# Patient Record
Sex: FEMALE | Race: WHITE | Employment: OTHER | URBAN - METROPOLITAN AREA
[De-identification: names, ages, dates, MRNs, and addresses within clinical notes are randomized per-mention and may not be internally consistent; named-entity substitution may affect disease eponyms.]

---

## 2017-02-08 ENCOUNTER — HOSPITAL ENCOUNTER (EMERGENCY)
Age: 82
Discharge: HOME | End: 2017-02-08
Attending: EMERGENCY MEDICINE
Payer: MEDICARE

## 2017-02-08 VITALS
TEMPERATURE: 98.1 F | HEIGHT: 64 IN | DIASTOLIC BLOOD PRESSURE: 59 MMHG | WEIGHT: 140 LBS | BODY MASS INDEX: 23.9 KG/M2 | SYSTOLIC BLOOD PRESSURE: 179 MMHG | RESPIRATION RATE: 16 BRPM | HEART RATE: 63 BPM | OXYGEN SATURATION: 99 %

## 2017-02-08 DIAGNOSIS — K42.9 UMBILICAL HERNIA WITHOUT OBSTRUCTION AND WITHOUT GANGRENE: Primary | ICD-10-CM

## 2017-02-08 PROCEDURE — 99283 EMERGENCY DEPT VISIT LOW MDM: CPT

## 2017-02-09 NOTE — ED TRIAGE NOTES
TRIAGE NOTE: Pt arrives via EMS for c/o abdominal hernia. Pt reports being able to push it back in earlier this evening but was unable to prior to EMS arrival. Pt denies pain at this time and thinks it may have gone back into place en route.

## 2017-02-09 NOTE — ROUTINE PROCESS
Pt discharged from ED with follow up care instructions. Abdominal binder placed on pt for comfort. VSS. MD aware of elevated BP at discharge. Pt to take scheduled medication this evening. Pt wheeled to [employer] car.

## 2017-02-09 NOTE — ED PROVIDER NOTES
HPI Comments: The patient is a 26-year-old female with a past medical history significant for atrial fibrillation, hypertension, hypercholesterolemia, umbilical hernia, who presents to the ED by EMS with a complaint of abdominal pain that began approximately an hour prior to arrival to the ED asked the patient states that her hernia popped out of her abdominal wall and could not be reduced. EMS was notified and the patient was transported to the ED because of severe abdominal pain. However, while in route, the patient was able to relax and her hernia spontaneously reduced. She is better at this time and denies any further discomfort. She denies headache, neck pain, back pain, sore throat, cough, congestion, diarrhea, constipation, nausea, vomiting, dizziness, weakness, numbness, dysuria, sick contacts or recent travel. Patient is a 80 y.o. female presenting with abdominal pain. Abdominal Pain           Past Medical History:   Diagnosis Date    HTN (hypertension) 7/31/2011    PAF (paroxysmal atrial fibrillation) ([facility] [address] 7/31/2011       Past Surgical History:   Procedure Laterality Date    Hx cataract removal      Hx knee arthroscopy           History reviewed. No pertinent family history. Social History     Social History    Marital status:      Spouse name: N/A    Number of children: N/A    Years of education: N/A     Occupational History    Not on file. Social History Main Topics    Smoking status: Former Smoker    Smokeless tobacco: Not on file    Alcohol use Yes    Drug use: Not on file    Sexual activity: Not on file     Other Topics Concern    Not on file     Social History Narrative    ** Merged History Encounter **              ALLERGIES: Benicar Meryle Party; Cleocin [clindamycin hcl]; Hydrochlorothiazide; Keflex [cephalexin]; and Sulfa (sulfonamide antibiotics)    Review of Systems   Gastrointestinal: Positive for abdominal pain.    All other systems reviewed and are negative. Vitals:    02/08/17 2211   BP: 173/69   Pulse: 74   Resp: 16   Temp: 98.1 °F (36.7 °C)   SpO2: 96%   Weight: 63.5 kg (140 lb)   Height: 5' 4\" (1.626 m)            Physical Exam   Nursing note and vitals reviewed. CONSTITUTIONAL: Well-appearing; well-nourished; in no apparent distress  HEAD: Normocephalic; atraumatic  EYES: PERRL; EOM intact; conjunctiva and sclera are clear bilaterally. ENT: No rhinorrhea; normal pharynx with no tonsillar hypertrophy; mucous membranes pink/moist, no erythema, no exudate. NECK: Supple; non-tender; no cervical lymphadenopathy  CARD: Normal S1, S2; no murmurs, rubs, or gallops. Regular rate and rhythm. RESP: Normal respiratory effort; breath sounds clear and equal bilaterally; no wheezes, rhonchi, or rales. ABD: Normal bowel sounds; non-distended; easily reducible umbilical hernia; non-tender; no palpable organomegaly, no masses, no bruits. Back Exam: Normal inspection; no vertebral point tenderness, no CVA tenderness. Normal range of motion. EXT: Normal ROM in all four extremities; non-tender to palpation; no swelling or deformity; distal pulses are normal, no edema. SKIN: Warm; dry; no rash. NEURO:Alert and oriented x 3, coherent, SAMMY-XII grossly intact, sensory and motor are non-focal.        MDM  Number of Diagnoses or Management Options  Umbilical hernia without obstruction and without gangrene:   Diagnosis management comments: Assessment: umbilical hernia-reduced and stable/ hypertension-stable. The patient has not yet taken her nightly dose of antihypertensive    Plan: education and reassurance/ abdominal wall binder/ discharge with outpatient referral/ Monitor and Reevaluate.          Amount and/or Complexity of Data Reviewed  Clinical lab tests: ordered and reviewed  Tests in the radiology section of CPT®: ordered and reviewed  Tests in the medicine section of CPT®: reviewed and ordered  Discussion of test results with the performing providers: yes  Obtain history from someone other than the patient: yes  Discuss the patient with other providers: yes    Risk of Complications, Morbidity, and/or Mortality  Presenting problems: moderate  Diagnostic procedures: moderate  Management options: moderate      ED Course       Procedures     Progress Note:   Pt has been reexamined by Hannah Larsen MD. Pt is feeling much better. Symptoms have improved. All available results have been reviewed with pt and any available family. Pt understands sx, dx, and tx in ED. Care plan has been outlined and questions have been answered. Pt is ready to go home. Will send home on umbilical hernia/ hypertension instruction. Outpatient referral with PCP/ general surgery for elective repair of hernia as needed. Written by Hannah Larsen MD,10:27 PM    .   .

## 2017-02-09 NOTE — DISCHARGE INSTRUCTIONS
We hope that we have addressed all of your medical concerns. The examination and treatment you received in the Emergency Department were for an emergent problem and were not intended as complete care. It is important that you follow up with your healthcare provider(s) for ongoing care. If your symptoms worsen or do not improve as expected, and you are unable to reach your usual health care provider(s), you should return to the Emergency Department. Today's healthcare is undergoing tremendous change, and patient satisfaction surveys are one of the many tools to assess the quality of medical care. You may receive a survey from the [employer] regarding your experience in the Emergency Department. I hope that your experience has been completely positive, particularly the medical care that I provided. As such, please participate in the survey; anything less than excellent does not meet my expectations or intentions. [address] [place] [address] and [facility] participate in nationally recognized quality of care measures. If your blood pressure is greater than 120/80, as reported below, we urge that you seek medical care to address the potential of high blood pressure, commonly known as hypertension. Hypertension can be hereditary or can be caused by certain medical conditions, pain, stress, or \"white coat syndrome. \"       Please make an appointment with your health care provider(s) for follow up of your Emergency Department visit. VITALS:   Patient Vitals for the past 8 hrs:   Temp Pulse Resp BP SpO2   02/08/17 2211 98.1 °F (36.7 °C) 74 16 173/69 96 %          Thank you for allowing us to provide you with medical care today. We realize that you have many choices for your emergency care needs. Please choose us in the future for any continued health care needs. Connie Olivas MD    [address] [place] [address].   Office: [phone]            No results found for this or any previous visit (from the past 24 hour(s)). No results found. Hernia: Care Instructions  Your Care Instructions    A hernia develops when tissue bulges through a weak spot in the wall of your belly. The groin area and the navel are common areas for a hernia. A hernia can also develop near the area of a surgery you had before. Pressure from lifting, straining, or coughing can tear the weak area, causing the hernia to bulge and be painful. If you cannot push a hernia back into place, the tissue may become trapped outside the belly wall. If the hernia gets twisted and loses its blood supply, it will swell and die. This is called a strangulated hernia. It usually causes a lot of pain. It needs treatment right away. Some hernias need to be repaired to prevent a strangulated hernia. If your hernia causes symptoms or is large, you may need surgery. Follow-up care is a key part of your treatment and safety. Be sure to make and go to all appointments, and call your doctor if you are having problems. Its also a good idea to know your test results and keep a list of the medicines you take. How can you care for yourself at home? · Take care when lifting heavy objects. · Stay at a healthy weight. · Do not smoke. Smoking can cause coughing, which can cause your hernia to bulge. If you need help quitting, talk to your doctor about stop-smoking programs and medicines. These can increase your chances of quitting for good. · Talk with your doctor before wearing a corset or truss for a hernia. These devices are not recommended for treating hernias and sometimes can do more harm than good. There may be certain situations when your doctor thinks a truss would work, but these are rare. When should you call for help? Call your doctor now or seek immediate medical care if:  · You have severe belly pain. · You have nausea or vomiting.   · You have belly pain and are not passing gas or stool. · You cannot push the hernia back into place with gentle pressure when you are lying down. · The skin over the hernia turns red or becomes tender. Watch closely for changes in your health, and be sure to contact your doctor if:  · You have new or increased pain. · You do not get better as expected. Where can you learn more? Go to http://aaliyah-navneet.info/. Enter C129 in the search box to learn more about \"Hernia: Care Instructions. \"  Current as of: August 9, 2016  Content Version: 11.1  © 7154-5068 [email or web address]. Care instructions adapted under license by [employer] (which disclaims liability or warranty for this information). If you have questions about a medical condition or this instruction, always ask your healthcare professional. [address] any warranty or liability for your use of this information.

## 2017-02-22 ENCOUNTER — OFFICE VISIT (OUTPATIENT)
Dept: SURGERY | Age: 82
End: 2017-02-22

## 2017-02-22 VITALS
TEMPERATURE: 98.4 F | RESPIRATION RATE: 16 BRPM | HEART RATE: 66 BPM | SYSTOLIC BLOOD PRESSURE: 158 MMHG | DIASTOLIC BLOOD PRESSURE: 94 MMHG | BODY MASS INDEX: 23.39 KG/M2 | WEIGHT: 137 LBS | HEIGHT: 64 IN | OXYGEN SATURATION: 95 %

## 2017-02-22 DIAGNOSIS — K42.9 UMBILICAL HERNIA WITHOUT OBSTRUCTION AND WITHOUT GANGRENE: Primary | ICD-10-CM

## 2017-02-22 NOTE — PROGRESS NOTES
Surgery Consult    Subjective:      Unique Valadez is a 80 y.o.  female who was referred by Dr. Luis A Tyler for evaluation of umbilical hernia. She has had this hernia for at least 10 years now but it recently popped out and she was unable to reduce it. She was concerned and was transported to an ER but in transit, she was able to reduce the hernia back in. She presents today to discuss possible surgical intervention. Chief Complaint   Patient presents with    Umbilical Hernia       Patient Active Problem List    Diagnosis Date Noted    Umbilical hernia 66/13/5887    Essential hypertension 05/03/2015    Deaf 08/27/2013    PAF (paroxysmal atrial fibrillation) ([facility] [address] 07/31/2011     Past Medical History:   Diagnosis Date    HTN (hypertension) 7/31/2011    PAF (paroxysmal atrial fibrillation) ([facility] [address] 8/83/6551    Umbilical hernia 3/46/5815      Past Surgical History:   Procedure Laterality Date    HX CATARACT REMOVAL      HX KNEE ARTHROSCOPY        Social History   Substance Use Topics    Smoking status: Former Smoker    Smokeless tobacco: Not on file    Alcohol use Yes      No family history on file. Current Outpatient Prescriptions   Medication Sig    diltiazem CD (CARDIZEM CD) 240 mg ER capsule Take 1 Cap by mouth daily.  multivitamin (ONE A DAY) tablet Take 1 Tab by mouth daily.  cloNIDine (CATAPRES) 0.1 mg/24 hr patch 1 Patch by TransDERmal route every seven (7) days.  labetalol (NORMODYNE) 200 mg tablet TAKE 1 TABLET BY MOUTH EVERY DAY     No current facility-administered medications for this visit. Allergies   Allergen Reactions    Benicar [Olmesartan] Cough    Cleocin [Clindamycin Hcl] Hives    Hydrochlorothiazide Other (comments)     GI upset - pancreatitis ?     Keflex [Cephalexin] Hives    Sulfa (Sulfonamide Antibiotics) Unknown (comments)        Review of Systems:    A comprehensive review of systems was negative except for that written in the History of Present Illness. Objective:     Visit Vitals    BP (!) 158/94 (BP 1 Location: Right arm, BP Patient Position: Sitting)    Pulse 66    Temp 98.4 °F (36.9 °C) (Oral)    Resp 16    Ht 5' 4\" (1.626 m)    Wt 137 lb (62.1 kg)    SpO2 95%    BMI 23.52 kg/m2         Physical Exam:  General appearance: alert, cooperative, no distress, appears stated age  Head: Normocephalic, without obvious abnormality, atraumatic  Abdomen: no organomegaly, 1 cm umbilical hernia with 2 cm mushroom non-tender and easily reducible   Neurologic: Grossly normal      Assessment:       ICD-10-CM ICD-9-CM    1. Umbilical hernia without obstruction and without gangrene K42.9 553.1      Does not bother her enough yet to want to have it repaired    Plan:     No indication for surgical intervention. Call for questions. Written by ujli Cardenas, as dictated by Lucho Wilson MD.    Total face to face time with patient: 12 minutes. Greater than 50% of the time was spent in counseling. Signed By: Lucho Wilson MD     February 22, 2017

## 2017-02-22 NOTE — PROGRESS NOTES
1. Have you been to the ER, urgent care clinic since your last visit? Hospitalized since your last visit? Yes - 1-3-35 - umbilical hernia    2. Have you seen or consulted any other health care providers outside of the [address] [place] [address] since your last visit? Include any pap smears or colon screening.  No

## 2017-02-22 NOTE — MR AVS SNAPSHOT
Visit Information Date & Time Provider Department Dept. Phone Encounter #  
 2/22/2017  2:00 PM Philippkarina ParekhHeather [phone] [id number] Upcoming Health Maintenance Date Due DTaP/Tdap/Td series (1 - Tdap) 5/20/1944 GLAUCOMA SCREENING Q2Y 5/20/1988 OSTEOPOROSIS SCREENING (DEXA) 5/20/1988 MEDICARE YEARLY EXAM 5/20/1988 Pneumococcal 65+ Low/Medium Risk (2 of 2 - PPSV23) 7/31/1997 INFLUENZA AGE 9 TO ADULT 8/1/2016 Allergies as of 2/22/2017  Review Complete On: 2/22/2017 By: Ngoc Parekh MD  
  
 Severity Noted Reaction Type Reactions Benicar [Olmesartan]  07/31/2011    Cough Cleocin [Clindamycin Hcl]  02/08/2017    Hives Hydrochlorothiazide  08/02/2014    Other (comments) GI upset - pancreatitis ? Keflex [Cephalexin]  02/08/2017    Hives Sulfa (Sulfonamide Antibiotics)  07/31/2011    Unknown (comments) Current Immunizations  Reviewed on 8/27/2013 Name Date Pneumococcal Vaccine (Unspecified Type) 7/31/1992 Zoster Vaccine, Live 8/27/2007 Not reviewed this visit You Were Diagnosed With   
  
 Codes Comments Umbilical hernia without obstruction and without gangrene    -  Primary ICD-10-CM: K42.9 ICD-9-CM: 553.1 Vitals BP  
  
  
  
  
  
 (!) 158/94 (BP 1 Location: Right arm, BP Patient Position: Sitting) BMI and BSA Data Body Mass Index Body Surface Area  
 23.52 kg/m 2 1.67 m 2 Preferred Pharmacy Pharmacy Name Phone [facility] [address] [facility] [phone] Your Updated Medication List  
  
   
This list is accurate as of: 2/22/17  3:00 PM.  Always use your most recent med list.  
  
  
  
  
 cloNIDine 0.1 mg/24 hr patch Commonly known as:  CATAPRES  
1 Patch by TransDERmal route every seven (7) days. dilTIAZem  mg ER capsule Commonly known as:  CARDIZEM CD Take 1 Cap by mouth daily. labetalol 200 mg tablet Commonly known as:  NORMODYNE  
TAKE 1 TABLET BY MOUTH EVERY DAY  
  
 multivitamin tablet Commonly known as:  ONE A DAY Take 1 Tab by mouth daily. Patient Instructions MyChart Activation Thank you for requesting access to [employer]. Please follow the instructions below to securely access and download your online medical record. [employer] allows you to send messages to your doctor, view your test results, renew your prescriptions, schedule appointments, and more. How Do I Sign Up? 1. In your internet browser, go to [email or web address] 
2. Click on the First Time User? Click Here link in the Sign In box. You will be redirect to the New Member Sign Up page. 3. Enter your [employer]s Access Code exactly as it appears below. You will not need to use this code after youve completed the sign-up process. If you do not sign up before the expiration date, you must request a new code. [employer]s Access Code: [email or web address] Expires: 2017 10:05 PM (This is the date your [employer]s access code will ) 4. Enter the last four digits of your Social Security Number (xxxx) and Date of Birth (mm/dd/yyyy) as indicated and click Submit. You will be taken to the next sign-up page. 5. Create a [employer]s ID. This will be your [employer] login ID and cannot be changed, so think of one that is secure and easy to remember. 6. Create a [employer] password. You can change your password at any time. 7. Enter your Password Reset Question and Answer. This can be used at a later time if you forget your password. 8. Enter your e-mail address. You will receive e-mail notification when new information is available in [address] 9. Click Sign Up. You can now view and download portions of your medical record. 10. Click the Download Summary menu link to download a portable copy of your medical information. Additional Information If you have questions, please visit the Frequently Asked Questions section of the [employer] website at [email or web address] Remember, [employer] is NOT to be used for urgent needs. For medical emergencies, dial 911. Introducing [facility]! Mari Suri introduces [employer] patient portal. Now you can access parts of your medical record, email your doctor's office, and request medication refills online. 1. In your internet browser, go to [email or web address] Click 2. Click on the First Time User? Click Here link in the Sign In box. You will see the New Member Sign Up page. 3. Enter your [employer] Access Code exactly as it appears below. You will not need to use this code after youve completed the sign-up process. If you do not sign up before the expiration date, you must request a new code. · [employer] Access Code: [email or web address] Expires: 5/9/2017 10:05 PM 
 
4. Enter the last four digits of your Social Security Number (xxxx) and Date of Birth (mm/dd/yyyy) as indicated and click Submit. You will be taken to the next sign-up page. 5. Create a [employer] ID. This will be your [employer] login ID and cannot be changed, so think of one that is secure and easy to remember. 6. Create a [employer] password. You can change your password at any time. 7. Enter your Password Reset Question and Answer. This can be used at a later time if you forget your password. 8. Enter your e-mail address. You will receive e-mail notification when new information is available in [address] 9. Click Sign Up. You can now view and download portions of your medical record. 10. Click the Download Summary menu link to download a portable copy of your medical information. If you have questions, please visit the Frequently Asked Questions section of the [employer] website. Remember, [employer] is NOT to be used for urgent needs. For medical emergencies, dial 911. Now available from your iPhone and Android! Please provide this summary of care documentation to your next provider. Your primary care clinician is listed as DOMINIQUE Solomon. If you have any questions after today's visit, please call [phone].

## 2017-02-22 NOTE — PATIENT INSTRUCTIONS
[employer] Activation    Thank you for requesting access to [employer]. Please follow the instructions below to securely access and download your online medical record. [employer] allows you to send messages to your doctor, view your test results, renew your prescriptions, schedule appointments, and more. How Do I Sign Up? 1. In your internet browser, go to [email or web address]  2. Click on the First Time User? Click Here link in the Sign In box. You will be redirect to the New Member Sign Up page. 3. Enter your [employer] Access Code exactly as it appears below. You will not need to use this code after youve completed the sign-up process. If you do not sign up before the expiration date, you must request a new code. [employer] Access Code: [email or web address]  Expires: 2017 10:05 PM (This is the date your [employer] access code will )    4. Enter the last four digits of your Social Security Number (xxxx) and Date of Birth (mm/dd/yyyy) as indicated and click Submit. You will be taken to the next sign-up page. 5. Create a [employer] ID. This will be your [employer] login ID and cannot be changed, so think of one that is secure and easy to remember. 6. Create a [employer] password. You can change your password at any time. 7. Enter your Password Reset Question and Answer. This can be used at a later time if you forget your password. 8. Enter your e-mail address. You will receive e-mail notification when new information is available in [address] 9. Click Sign Up. You can now view and download portions of your medical record. 10. Click the Download Summary menu link to download a portable copy of your medical information. Additional Information    If you have questions, please visit the Frequently Asked Questions section of the [employer] website at [email or web address] Remember, [employer] is NOT to be used for urgent needs. For medical emergencies, dial 911.

## 2017-07-26 ENCOUNTER — OFFICE VISIT (OUTPATIENT)
Dept: SURGERY | Age: 82
End: 2017-07-26

## 2017-07-26 VITALS
SYSTOLIC BLOOD PRESSURE: 182 MMHG | HEIGHT: 64 IN | OXYGEN SATURATION: 94 % | TEMPERATURE: 97.9 F | RESPIRATION RATE: 16 BRPM | DIASTOLIC BLOOD PRESSURE: 68 MMHG | WEIGHT: 134 LBS | BODY MASS INDEX: 22.88 KG/M2 | HEART RATE: 68 BPM

## 2017-07-26 DIAGNOSIS — K42.9 UMBILICAL HERNIA WITHOUT OBSTRUCTION AND WITHOUT GANGRENE: Primary | ICD-10-CM

## 2017-07-26 RX ORDER — DILTIAZEM HYDROCHLORIDE 180 MG/1
180 CAPSULE, COATED, EXTENDED RELEASE ORAL
COMMUNITY
Start: 2016-08-10 | End: 2017-08-01

## 2017-07-26 RX ORDER — LABETALOL 200 MG/1
1 TABLET, FILM COATED ORAL
COMMUNITY
Start: 2017-04-25 | End: 2017-08-01

## 2017-07-26 RX ORDER — CLONIDINE 0.1 MG/24H
1 PATCH, EXTENDED RELEASE TRANSDERMAL
COMMUNITY
Start: 2016-07-07 | End: 2017-08-01

## 2017-07-26 RX ORDER — SENNOSIDES 8.6 MG/1
8.6 TABLET ORAL
COMMUNITY
Start: 2017-07-25 | End: 2017-08-01

## 2017-07-26 RX ORDER — POLYETHYLENE GLYCOL 3350 17 G/17G
17 POWDER, FOR SOLUTION ORAL
COMMUNITY
Start: 2017-07-25 | End: 2017-08-01

## 2017-07-26 NOTE — PROGRESS NOTES
Subjective:      Mathieu Andres is a 80 y.o.  female who presents today to discuss umbilical hernia surgery. The hernia popped out again last night and she went to the ER in [place], three minutes before the doctor came into the room, it spontaneously reduced. Chief Complaint   Patient presents with    Umbilical Hernia     Discuss Hernia Surgery. Patient Active Problem List    Diagnosis Date Noted    Umbilical hernia 69/49/3272    Essential hypertension 05/03/2015    Deaf 08/27/2013    PAF (paroxysmal atrial fibrillation) ([facility] [address] 07/31/2011     Past Medical History:   Diagnosis Date    HTN (hypertension) 7/31/2011    PAF (paroxysmal atrial fibrillation) ([facility] [address] 0/52/0275    Umbilical hernia 7/15/0885      Past Surgical History:   Procedure Laterality Date    HX CATARACT REMOVAL      HX KNEE ARTHROSCOPY        Social History   Substance Use Topics    Smoking status: Former Smoker    Smokeless tobacco: Never Used    Alcohol use Yes      History reviewed. No pertinent family history. Prior to Admission medications    Medication Sig Start Date End Date Taking? Authorizing Provider   therapeutic multivitamin-minerals (VITAMINS AND MINERALS) tablet Take 1 Tab by mouth. Yes Historical Provider   dilTIAZem CD (CARDIZEM CD) 180 mg ER capsule Take 180 mg by mouth. 8/10/16  Yes Historical Provider   labetalol (NORMODYNE) 200 mg tablet TAKE ONE TABLET BY MOUTH ONCE DAILY 4/25/17  Yes DOMINIQUE Quintana MD   multivitamin (ONE A DAY) tablet Take 1 Tab by mouth daily. Yes Historical Provider   polyethylene glycol (MIRALAX) 17 gram/dose powder Take 17 g by mouth. 7/25/17   Historical Provider   senna (SENOKOT) 8.6 mg tablet Take 8.6 mg by mouth. 7/25/17   Historical Provider   cloNIDine (CATAPRES) 0.1 mg/24 hr patch 1 Patch by TransDERmal route.  7/7/16   Historical Provider   labetalol (NORMODYNE) 200 mg tablet Take 1 Tab by mouth. 4/25/17   Historical Provider   diltiazem CD (CARDIZEM CD) 240 mg ER capsule Take 1 Cap by mouth daily. 8/10/16   Mary Kate Willams MD   cloNIDine (CATAPRES) 0.1 mg/24 hr patch 1 Patch by TransDERmal route every seven (7) days. 7/7/16   Mary Kate Willams MD     Allergies   Allergen Reactions    Benicar [Olmesartan] Cough    Cleocin [Clindamycin Hcl] Hives    Hydrochlorothiazide Other (comments)     GI upset - pancreatitis ?  Keflex [Cephalexin] Hives    Sulfa (Sulfonamide Antibiotics) Unknown (comments)         Review of Systems:    A comprehensive review of systems was negative except for that written in the History of Present Illness. Objective:     Visit Vitals    /68 (BP 1 Location: Left arm, BP Patient Position: Sitting)    Pulse 68    Temp 97.9 °F (36.6 °C) (Oral)    Resp 16    Ht 5' 4\" (1.626 m)    Wt 134 lb (60.8 kg)    SpO2 94%    BMI 23 kg/m2       Physical Exam:  General appearance: alert, cooperative, no distress, appears stated age  Head: Normocephalic, without obvious abnormality, atraumatic  Lungs: clear to auscultation bilaterally  Heart: regular rate and rhythm, S1, S2 normal, no murmur, click, rub or gallop  Abdomen: There is a tender and reducible, 5.9ZH umbilical hernia. Neurologic: Grossly normal      Assessment:       ICD-10-CM ICD-9-CM    1. Umbilical hernia without obstruction and without gangrene K42.9 553.1        Plan: Will repair the umbilical hernia with observation overnight in the hospital.    Written by juli Dunn, as dictated by Gonzalo Gomez. Vedia Dance, MD.    Total face to face time with patient: 9 minutes. Greater than 50% of the time was spent in counseling. Signed By: Bjorn Leap Vedia Dance, MD    July 26, 2017

## 2017-07-26 NOTE — PROGRESS NOTES
Aziza Meyers is a 80 y.o. female    Chief Complaint   Patient presents with    Umbilical Hernia     Discuss Hernia Surgery.

## 2017-07-26 NOTE — MR AVS SNAPSHOT
Visit Information Date & Time Provider Department Dept. Phone Encounter #  
 7/26/2017  1:50 PM Evelia Leonard, [address] [phone] [id number] Follow-up Instructions Routing History Follow-up and Disposition History Upcoming Health Maintenance Date Due DTaP/Tdap/Td series (1 - Tdap) 5[birth year]20/1944 GLAUCOMA SCREENING Q2Y 5/20/1988 OSTEOPOROSIS SCREENING (DEXA) 5/20/1988 MEDICARE YEARLY EXAM 5/20/1988 Pneumococcal 65+ Low/Medium Risk (2 of 2 - PPSV23) 7/31/1997 INFLUENZA AGE 9 TO ADULT 8/1/2017 Allergies as of 7/26/2017  Review Complete On: 7/26/2017 By: Evelia Leonard MD  
  
 Severity Noted Reaction Type Reactions Benicar [Olmesartan]  07/31/2011    Cough Cleocin [Clindamycin Hcl]  02/08/2017    Hives Hydrochlorothiazide  08/02/2014    Other (comments) GI upset - pancreatitis ? Keflex [Cephalexin]  02/08/2017    Hives Sulfa (Sulfonamide Antibiotics)  07/31/2011    Unknown (comments) Current Immunizations  Reviewed on 8/27/2013 Name Date ZZZ-RETIRED (DO NOT USE) Pneumococcal Vaccine (Unspecified Type) 7/31/1992 Zoster Vaccine, Live 8/27/2007 Not reviewed this visit You Were Diagnosed With   
  
 Codes Comments Umbilical hernia without obstruction and without gangrene    -  Primary ICD-10-CM: K42.9 ICD-9-CM: 553.1 Vitals BP Pulse Temp Resp Height(growth percentile) Weight(growth percentile) 182/68 (BP 1 Location: Left arm, BP Patient Position: Sitting) 68 97.9 °F (36.6 °C) (Oral) 16 5' 4\" (1.626 m) 134 lb (60.8 kg) SpO2 BMI OB Status Smoking Status 94% 23 kg/m2 Postmenopausal Former Smoker Vitals History BMI and BSA Data Body Mass Index Body Surface Area  
 23 kg/m 2 1.66 m 2 Preferred Pharmacy Pharmacy Name Phone [facility] [address] Dariel Tiffanie [phone] Your Updated Medication List  
  
   
 This list is accurate as of: 7/26/17  2:27 PM.  Always use your most recent med list.  
  
  
  
  
 * cloNIDine 0.1 mg/24 hr patch Commonly known as:  CATAPRES  
1 Patch by TransDERmal route every seven (7) days. * cloNIDine 0.1 mg/24 hr patch Commonly known as:  CATAPRES  
1 Patch by TransDERmal route. * dilTIAZem  mg ER capsule Commonly known as:  CARDIZEM CD Take 1 Cap by mouth daily. * CARDIZEM  mg ER capsule Generic drug:  dilTIAZem CD Take 180 mg by mouth. * labetalol 200 mg tablet Commonly known as:  NORMODYNE  
TAKE ONE TABLET BY MOUTH ONCE DAILY  
  
 * labetalol 200 mg tablet Commonly known as:  Linda Beckers Take 1 Tab by mouth. MIRALAX 17 gram/dose powder Generic drug:  polyethylene glycol Take 17 g by mouth.  
  
 multivitamin tablet Commonly known as:  ONE A DAY Take 1 Tab by mouth daily. senna 8.6 mg tablet Commonly known as:  [employer] Take 8.6 mg by mouth. VITAMINS AND MINERALS tablet Generic drug:  therapeutic multivitamin-minerals Take 1 Tab by mouth. * Notice: This list has 6 medication(s) that are the same as other medications prescribed for you. Read the directions carefully, and ask your doctor or other care provider to review them with you. Introducing [facility]! Dear Chapis Parkinson: 
Thank you for requesting a [employer] account. Our records indicate that you already have an active [employer] account. You can access your account anytime at [email or web address] Did you know that you can access your hospital and ER discharge instructions at any time in [employer]? You can also review all of your test results from your hospital stay or ER visit. Additional Information If you have questions, please visit the Frequently Asked Questions section of the [employer] website at [email or web address] Remember, [employer] is NOT to be used for urgent needs.  For medical emergencies, dial 911. Now available from your iPhone and Android! Please provide this summary of care documentation to your next provider. Your primary care clinician is listed as DOMINIQUE Hamilton. If you have any questions after today's visit, please call [phone].

## 2017-08-01 ENCOUNTER — HOSPITAL ENCOUNTER (OUTPATIENT)
Dept: GENERAL RADIOLOGY | Age: 82
Discharge: HOME | End: 2017-08-01
Payer: MEDICARE

## 2017-08-01 ENCOUNTER — HOSPITAL ENCOUNTER (OUTPATIENT)
Dept: PREADMISSION TESTING | Age: 82
Discharge: HOME | End: 2017-08-01
Payer: MEDICARE

## 2017-08-01 VITALS
HEART RATE: 67 BPM | DIASTOLIC BLOOD PRESSURE: 62 MMHG | SYSTOLIC BLOOD PRESSURE: 158 MMHG | RESPIRATION RATE: 20 BRPM | BODY MASS INDEX: 22.71 KG/M2 | HEIGHT: 64 IN | TEMPERATURE: 98.2 F | WEIGHT: 133 LBS

## 2017-08-01 LAB
ALBUMIN SERPL BCP-MCNC: 3 G/DL (ref 3.5–5)
ALBUMIN/GLOB SERPL: 0.9 {RATIO} (ref 1.1–2.2)
ALP SERPL-CCNC: 66 U/L (ref 45–117)
ALT SERPL-CCNC: 18 U/L (ref 12–78)
ANION GAP BLD CALC-SCNC: 5 MMOL/L (ref 5–15)
AST SERPL W P-5'-P-CCNC: 15 U/L (ref 15–37)
BASOPHILS # BLD AUTO: 0.1 K/UL (ref 0–0.1)
BASOPHILS # BLD: 1 % (ref 0–1)
BILIRUB SERPL-MCNC: 0.3 MG/DL (ref 0.2–1)
BUN SERPL-MCNC: 22 MG/DL (ref 6–20)
BUN/CREAT SERPL: 27 (ref 12–20)
CALCIUM SERPL-MCNC: 8.6 MG/DL (ref 8.5–10.1)
CHLORIDE SERPL-SCNC: 103 MMOL/L (ref 97–108)
CO2 SERPL-SCNC: 30 MMOL/L (ref 21–32)
CREAT SERPL-MCNC: 0.81 MG/DL (ref 0.55–1.02)
EOSINOPHIL # BLD: 0.1 K/UL (ref 0–0.4)
EOSINOPHIL NFR BLD: 3 % (ref 0–7)
ERYTHROCYTE [DISTWIDTH] IN BLOOD BY AUTOMATED COUNT: 14.3 % (ref 11.5–14.5)
GLOBULIN SER CALC-MCNC: 3.2 G/DL (ref 2–4)
GLUCOSE SERPL-MCNC: 141 MG/DL (ref 65–100)
HCT VFR BLD AUTO: 36 % (ref 35–47)
HGB BLD-MCNC: 12 G/DL (ref 11.5–16)
LYMPHOCYTES # BLD AUTO: 26 % (ref 12–49)
LYMPHOCYTES # BLD: 1.4 K/UL (ref 0.8–3.5)
MCH RBC QN AUTO: 30.8 PG (ref 26–34)
MCHC RBC AUTO-ENTMCNC: 33.3 G/DL (ref 30–36.5)
MCV RBC AUTO: 92.5 FL (ref 80–99)
MONOCYTES # BLD: 0.7 K/UL (ref 0–1)
MONOCYTES NFR BLD AUTO: 12 % (ref 5–13)
NEUTS SEG # BLD: 3.1 K/UL (ref 1.8–8)
NEUTS SEG NFR BLD AUTO: 58 % (ref 32–75)
PLATELET # BLD AUTO: 220 K/UL (ref 150–400)
POTASSIUM SERPL-SCNC: 4.2 MMOL/L (ref 3.5–5.1)
PROT SERPL-MCNC: 6.2 G/DL (ref 6.4–8.2)
RBC # BLD AUTO: 3.89 M/UL (ref 3.8–5.2)
SODIUM SERPL-SCNC: 138 MMOL/L (ref 136–145)
WBC # BLD AUTO: 5.4 K/UL (ref 3.6–11)

## 2017-08-01 PROCEDURE — 36415 COLL VENOUS BLD VENIPUNCTURE: CPT | Performed by: SURGERY

## 2017-08-01 PROCEDURE — 85025 COMPLETE CBC W/AUTO DIFF WBC: CPT | Performed by: SURGERY

## 2017-08-01 PROCEDURE — 71020 XR CHEST PA LAT: CPT

## 2017-08-01 PROCEDURE — 93005 ELECTROCARDIOGRAM TRACING: CPT

## 2017-08-01 PROCEDURE — 80053 COMPREHEN METABOLIC PANEL: CPT | Performed by: SURGERY

## 2017-08-01 RX ORDER — DILTIAZEM HYDROCHLORIDE 180 MG/1
180 CAPSULE, COATED, EXTENDED RELEASE ORAL 2 TIMES DAILY
COMMUNITY

## 2017-08-02 ENCOUNTER — ANESTHESIA EVENT (OUTPATIENT)
Dept: SURGERY | Age: 82
End: 2017-08-02
Payer: MEDICARE

## 2017-08-02 LAB
ATRIAL RATE: 67 BPM
CALCULATED P AXIS, ECG09: 78 DEGREES
CALCULATED R AXIS, ECG10: -14 DEGREES
CALCULATED T AXIS, ECG11: -48 DEGREES
DIAGNOSIS, 93000: NORMAL
P-R INTERVAL, ECG05: 260 MS
Q-T INTERVAL, ECG07: 396 MS
QRS DURATION, ECG06: 100 MS
QTC CALCULATION (BEZET), ECG08: 418 MS
VENTRICULAR RATE, ECG03: 67 BPM

## 2017-08-02 NOTE — PERIOP NOTES
MESSAGE SENT TO [facility] NURSE THROUGH [facility] WITH ABNORMAL CXR RESULTS AND EKG SENT TO ANESTHESIA FOR REVIEW.

## 2017-08-03 ENCOUNTER — ANESTHESIA (OUTPATIENT)
Dept: SURGERY | Age: 82
End: 2017-08-03
Payer: MEDICARE

## 2017-08-03 ENCOUNTER — HOSPITAL ENCOUNTER (OUTPATIENT)
Age: 82
Setting detail: OBSERVATION
Discharge: HOME | End: 2017-08-05
Attending: SURGERY | Admitting: SURGERY
Payer: MEDICARE

## 2017-08-03 PROCEDURE — 76210000016 HC OR PH I REC 1 TO 1.5 HR: Performed by: SURGERY

## 2017-08-03 PROCEDURE — 77030010507 HC ADH SKN DERMBND J&J -B: Performed by: SURGERY

## 2017-08-03 PROCEDURE — 77030011640 HC PAD GRND REM COVD -A: Performed by: SURGERY

## 2017-08-03 PROCEDURE — 74011250637 HC RX REV CODE- 250/637: Performed by: SURGERY

## 2017-08-03 PROCEDURE — 77030002933 HC SUT MCRYL J&J -A: Performed by: SURGERY

## 2017-08-03 PROCEDURE — 77030010509 HC AIRWY LMA MSK TELE -A: Performed by: ANESTHESIOLOGY

## 2017-08-03 PROCEDURE — 74011000250 HC RX REV CODE- 250: Performed by: SURGERY

## 2017-08-03 PROCEDURE — 74011250636 HC RX REV CODE- 250/636

## 2017-08-03 PROCEDURE — 99218 HC RM OBSERVATION: CPT

## 2017-08-03 PROCEDURE — 76060000032 HC ANESTHESIA 0.5 TO 1 HR: Performed by: SURGERY

## 2017-08-03 PROCEDURE — 74011000250 HC RX REV CODE- 250

## 2017-08-03 PROCEDURE — 74011250636 HC RX REV CODE- 250/636: Performed by: SURGERY

## 2017-08-03 PROCEDURE — 76010000138 HC OR TIME 0.5 TO 1 HR: Performed by: SURGERY

## 2017-08-03 PROCEDURE — C1781 MESH (IMPLANTABLE): HCPCS | Performed by: SURGERY

## 2017-08-03 PROCEDURE — 77030032490 HC SLV COMPR SCD KNE COVD -B: Performed by: SURGERY

## 2017-08-03 PROCEDURE — 77030018836 HC SOL IRR NACL ICUM -A: Performed by: SURGERY

## 2017-08-03 PROCEDURE — 74011250636 HC RX REV CODE- 250/636: Performed by: ANESTHESIOLOGY

## 2017-08-03 DEVICE — MESH VENTRALEX ST MED --: Type: IMPLANTABLE DEVICE | Site: ABDOMEN | Status: FUNCTIONAL

## 2017-08-03 RX ORDER — DEXTROSE, SODIUM CHLORIDE, SODIUM LACTATE, POTASSIUM CHLORIDE, AND CALCIUM CHLORIDE 5; .6; .31; .03; .02 G/100ML; G/100ML; G/100ML; G/100ML; G/100ML
125 INJECTION, SOLUTION INTRAVENOUS CONTINUOUS
Status: DISCONTINUED | OUTPATIENT
Start: 2017-08-03 | End: 2017-08-03 | Stop reason: HOSPADM

## 2017-08-03 RX ORDER — DILTIAZEM HYDROCHLORIDE 180 MG/1
180 CAPSULE, COATED, EXTENDED RELEASE ORAL 2 TIMES DAILY
Status: DISCONTINUED | OUTPATIENT
Start: 2017-08-03 | End: 2017-08-05 | Stop reason: HOSPADM

## 2017-08-03 RX ORDER — LIDOCAINE HYDROCHLORIDE 10 MG/ML
0.5 INJECTION, SOLUTION EPIDURAL; INFILTRATION; INTRACAUDAL; PERINEURAL AS NEEDED
Status: DISCONTINUED | OUTPATIENT
Start: 2017-08-03 | End: 2017-08-03 | Stop reason: HOSPADM

## 2017-08-03 RX ORDER — FENTANYL CITRATE 50 UG/ML
25 INJECTION, SOLUTION INTRAMUSCULAR; INTRAVENOUS
Status: DISCONTINUED | OUTPATIENT
Start: 2017-08-03 | End: 2017-08-03 | Stop reason: HOSPADM

## 2017-08-03 RX ORDER — SODIUM CHLORIDE 0.9 % (FLUSH) 0.9 %
5-10 SYRINGE (ML) INJECTION AS NEEDED
Status: DISCONTINUED | OUTPATIENT
Start: 2017-08-03 | End: 2017-08-05 | Stop reason: HOSPADM

## 2017-08-03 RX ORDER — DEXTROSE, SODIUM CHLORIDE, AND POTASSIUM CHLORIDE 5; .45; .15 G/100ML; G/100ML; G/100ML
75 INJECTION INTRAVENOUS CONTINUOUS
Status: DISCONTINUED | OUTPATIENT
Start: 2017-08-03 | End: 2017-08-04

## 2017-08-03 RX ORDER — PROPOFOL 10 MG/ML
INJECTION, EMULSION INTRAVENOUS AS NEEDED
Status: DISCONTINUED | OUTPATIENT
Start: 2017-08-03 | End: 2017-08-03 | Stop reason: HOSPADM

## 2017-08-03 RX ORDER — HYDROCODONE BITARTRATE AND ACETAMINOPHEN 5; 325 MG/1; MG/1
1 TABLET ORAL
Qty: 20 TAB | Refills: 0 | Status: SHIPPED | OUTPATIENT
Start: 2017-08-03 | End: 2020-11-13

## 2017-08-03 RX ORDER — LIDOCAINE HYDROCHLORIDE 20 MG/ML
INJECTION, SOLUTION EPIDURAL; INFILTRATION; INTRACAUDAL; PERINEURAL AS NEEDED
Status: DISCONTINUED | OUTPATIENT
Start: 2017-08-03 | End: 2017-08-03 | Stop reason: HOSPADM

## 2017-08-03 RX ORDER — FENTANYL CITRATE 50 UG/ML
50 INJECTION, SOLUTION INTRAMUSCULAR; INTRAVENOUS AS NEEDED
Status: DISCONTINUED | OUTPATIENT
Start: 2017-08-03 | End: 2017-08-03 | Stop reason: HOSPADM

## 2017-08-03 RX ORDER — LEVOFLOXACIN 5 MG/ML
500 INJECTION, SOLUTION INTRAVENOUS ONCE
Status: COMPLETED | OUTPATIENT
Start: 2017-08-03 | End: 2017-08-03

## 2017-08-03 RX ORDER — MIDAZOLAM HYDROCHLORIDE 1 MG/ML
1 INJECTION, SOLUTION INTRAMUSCULAR; INTRAVENOUS AS NEEDED
Status: DISCONTINUED | OUTPATIENT
Start: 2017-08-03 | End: 2017-08-03 | Stop reason: HOSPADM

## 2017-08-03 RX ORDER — ACETAMINOPHEN 325 MG/1
650 TABLET ORAL
Status: DISCONTINUED | OUTPATIENT
Start: 2017-08-03 | End: 2017-08-05 | Stop reason: HOSPADM

## 2017-08-03 RX ORDER — SODIUM CHLORIDE 0.9 % (FLUSH) 0.9 %
5-10 SYRINGE (ML) INJECTION AS NEEDED
Status: DISCONTINUED | OUTPATIENT
Start: 2017-08-03 | End: 2017-08-03 | Stop reason: HOSPADM

## 2017-08-03 RX ORDER — BUPIVACAINE HYDROCHLORIDE AND EPINEPHRINE 5; 5 MG/ML; UG/ML
30 INJECTION, SOLUTION EPIDURAL; INTRACAUDAL; PERINEURAL ONCE
Status: COMPLETED | OUTPATIENT
Start: 2017-08-03 | End: 2017-08-03

## 2017-08-03 RX ORDER — FENTANYL CITRATE 50 UG/ML
INJECTION, SOLUTION INTRAMUSCULAR; INTRAVENOUS AS NEEDED
Status: DISCONTINUED | OUTPATIENT
Start: 2017-08-03 | End: 2017-08-03 | Stop reason: HOSPADM

## 2017-08-03 RX ORDER — SODIUM CHLORIDE 0.9 % (FLUSH) 0.9 %
5-10 SYRINGE (ML) INJECTION EVERY 8 HOURS
Status: DISCONTINUED | OUTPATIENT
Start: 2017-08-03 | End: 2017-08-03 | Stop reason: HOSPADM

## 2017-08-03 RX ORDER — OXYCODONE HYDROCHLORIDE 5 MG/1
5 TABLET ORAL AS NEEDED
Status: DISCONTINUED | OUTPATIENT
Start: 2017-08-03 | End: 2017-08-03 | Stop reason: HOSPADM

## 2017-08-03 RX ORDER — LABETALOL 100 MG/1
100 TABLET, FILM COATED ORAL 2 TIMES DAILY
Status: DISCONTINUED | OUTPATIENT
Start: 2017-08-03 | End: 2017-08-03 | Stop reason: SDUPTHER

## 2017-08-03 RX ORDER — SODIUM CHLORIDE, SODIUM LACTATE, POTASSIUM CHLORIDE, CALCIUM CHLORIDE 600; 310; 30; 20 MG/100ML; MG/100ML; MG/100ML; MG/100ML
125 INJECTION, SOLUTION INTRAVENOUS CONTINUOUS
Status: DISCONTINUED | OUTPATIENT
Start: 2017-08-03 | End: 2017-08-03 | Stop reason: HOSPADM

## 2017-08-03 RX ORDER — HYDROMORPHONE HYDROCHLORIDE 1 MG/ML
0.2 INJECTION, SOLUTION INTRAMUSCULAR; INTRAVENOUS; SUBCUTANEOUS
Status: DISCONTINUED | OUTPATIENT
Start: 2017-08-03 | End: 2017-08-03 | Stop reason: HOSPADM

## 2017-08-03 RX ORDER — HYDROCODONE BITARTRATE AND ACETAMINOPHEN 5; 325 MG/1; MG/1
1 TABLET ORAL
Status: DISCONTINUED | OUTPATIENT
Start: 2017-08-03 | End: 2017-08-04

## 2017-08-03 RX ORDER — LABETALOL 100 MG/1
100 TABLET, FILM COATED ORAL
Status: DISCONTINUED | OUTPATIENT
Start: 2017-08-03 | End: 2017-08-05 | Stop reason: HOSPADM

## 2017-08-03 RX ORDER — MIDAZOLAM HYDROCHLORIDE 1 MG/ML
1 INJECTION, SOLUTION INTRAMUSCULAR; INTRAVENOUS
Status: DISCONTINUED | OUTPATIENT
Start: 2017-08-03 | End: 2017-08-03 | Stop reason: HOSPADM

## 2017-08-03 RX ORDER — SODIUM CHLORIDE, SODIUM LACTATE, POTASSIUM CHLORIDE, CALCIUM CHLORIDE 600; 310; 30; 20 MG/100ML; MG/100ML; MG/100ML; MG/100ML
INJECTION, SOLUTION INTRAVENOUS
Status: DISCONTINUED | OUTPATIENT
Start: 2017-08-03 | End: 2017-08-03 | Stop reason: HOSPADM

## 2017-08-03 RX ORDER — ONDANSETRON 2 MG/ML
INJECTION INTRAMUSCULAR; INTRAVENOUS AS NEEDED
Status: DISCONTINUED | OUTPATIENT
Start: 2017-08-03 | End: 2017-08-03 | Stop reason: HOSPADM

## 2017-08-03 RX ORDER — MORPHINE SULFATE 10 MG/ML
2 INJECTION, SOLUTION INTRAMUSCULAR; INTRAVENOUS
Status: DISCONTINUED | OUTPATIENT
Start: 2017-08-03 | End: 2017-08-03 | Stop reason: HOSPADM

## 2017-08-03 RX ORDER — SODIUM CHLORIDE 0.9 % (FLUSH) 0.9 %
5-10 SYRINGE (ML) INJECTION EVERY 8 HOURS
Status: DISCONTINUED | OUTPATIENT
Start: 2017-08-03 | End: 2017-08-05 | Stop reason: HOSPADM

## 2017-08-03 RX ORDER — ATROPINE SULFATE 0.4 MG/ML
INJECTION, SOLUTION ENDOTRACHEAL; INTRAMEDULLARY; INTRAMUSCULAR; INTRAVENOUS; SUBCUTANEOUS AS NEEDED
Status: DISCONTINUED | OUTPATIENT
Start: 2017-08-03 | End: 2017-08-03 | Stop reason: HOSPADM

## 2017-08-03 RX ORDER — HYDROMORPHONE HYDROCHLORIDE 1 MG/ML
0.5 INJECTION, SOLUTION INTRAMUSCULAR; INTRAVENOUS; SUBCUTANEOUS
Status: DISCONTINUED | OUTPATIENT
Start: 2017-08-03 | End: 2017-08-05 | Stop reason: HOSPADM

## 2017-08-03 RX ORDER — DIPHENHYDRAMINE HYDROCHLORIDE 50 MG/ML
12.5 INJECTION, SOLUTION INTRAMUSCULAR; INTRAVENOUS AS NEEDED
Status: DISCONTINUED | OUTPATIENT
Start: 2017-08-03 | End: 2017-08-03 | Stop reason: HOSPADM

## 2017-08-03 RX ORDER — GLYCOPYRROLATE 0.2 MG/ML
INJECTION INTRAMUSCULAR; INTRAVENOUS AS NEEDED
Status: DISCONTINUED | OUTPATIENT
Start: 2017-08-03 | End: 2017-08-03 | Stop reason: HOSPADM

## 2017-08-03 RX ORDER — DEXTROSE, SODIUM CHLORIDE, AND POTASSIUM CHLORIDE 5; .45; .15 G/100ML; G/100ML; G/100ML
INJECTION INTRAVENOUS
Status: COMPLETED
Start: 2017-08-03 | End: 2017-08-03

## 2017-08-03 RX ORDER — ONDANSETRON 2 MG/ML
4 INJECTION INTRAMUSCULAR; INTRAVENOUS AS NEEDED
Status: DISCONTINUED | OUTPATIENT
Start: 2017-08-03 | End: 2017-08-03 | Stop reason: HOSPADM

## 2017-08-03 RX ADMIN — ONDANSETRON 4 MG: 2 INJECTION INTRAMUSCULAR; INTRAVENOUS at 08:02

## 2017-08-03 RX ADMIN — HYDROCODONE BITARTRATE AND ACETAMINOPHEN 1 TABLET: 5; 325 TABLET ORAL at 21:46

## 2017-08-03 RX ADMIN — HYDROCODONE BITARTRATE AND ACETAMINOPHEN 1 TABLET: 5; 325 TABLET ORAL at 13:36

## 2017-08-03 RX ADMIN — LABETALOL HYDROCHLORIDE 100 MG: 100 TABLET, FILM COATED ORAL at 15:49

## 2017-08-03 RX ADMIN — MEPERIDINE HYDROCHLORIDE 12.5 MG: 25 INJECTION INTRAMUSCULAR; INTRAVENOUS; SUBCUTANEOUS at 09:20

## 2017-08-03 RX ADMIN — FENTANYL CITRATE 25 MCG: 50 INJECTION, SOLUTION INTRAMUSCULAR; INTRAVENOUS at 09:15

## 2017-08-03 RX ADMIN — DEXTROSE MONOHYDRATE, SODIUM CHLORIDE, AND POTASSIUM CHLORIDE 75 ML/HR: 50; 4.5; 1.49 INJECTION, SOLUTION INTRAVENOUS at 23:44

## 2017-08-03 RX ADMIN — SODIUM CHLORIDE, SODIUM LACTATE, POTASSIUM CHLORIDE, AND CALCIUM CHLORIDE 125 ML/HR: 600; 310; 30; 20 INJECTION, SOLUTION INTRAVENOUS at 07:00

## 2017-08-03 RX ADMIN — SODIUM CHLORIDE, SODIUM LACTATE, POTASSIUM CHLORIDE, CALCIUM CHLORIDE: 600; 310; 30; 20 INJECTION, SOLUTION INTRAVENOUS at 07:48

## 2017-08-03 RX ADMIN — GLYCOPYRROLATE 0.2 MG: 0.2 INJECTION INTRAMUSCULAR; INTRAVENOUS at 08:20

## 2017-08-03 RX ADMIN — Medication 10 ML: at 13:36

## 2017-08-03 RX ADMIN — DEXTROSE MONOHYDRATE, SODIUM CHLORIDE, AND POTASSIUM CHLORIDE 75 ML: 50; 4.5; 1.49 INJECTION, SOLUTION INTRAVENOUS at 09:55

## 2017-08-03 RX ADMIN — LEVOFLOXACIN 500 MG: 5 INJECTION, SOLUTION INTRAVENOUS at 08:02

## 2017-08-03 RX ADMIN — HYDROCODONE BITARTRATE AND ACETAMINOPHEN 1 TABLET: 5; 325 TABLET ORAL at 17:39

## 2017-08-03 RX ADMIN — DILTIAZEM HYDROCHLORIDE 180 MG: 180 CAPSULE, COATED, EXTENDED RELEASE ORAL at 21:53

## 2017-08-03 RX ADMIN — FENTANYL CITRATE 25 MCG: 50 INJECTION, SOLUTION INTRAMUSCULAR; INTRAVENOUS at 10:00

## 2017-08-03 RX ADMIN — ATROPINE SULFATE 0.2 MG: 0.4 INJECTION, SOLUTION ENDOTRACHEAL; INTRAMEDULLARY; INTRAMUSCULAR; INTRAVENOUS; SUBCUTANEOUS at 08:22

## 2017-08-03 RX ADMIN — FENTANYL CITRATE 25 MCG: 50 INJECTION, SOLUTION INTRAMUSCULAR; INTRAVENOUS at 08:10

## 2017-08-03 RX ADMIN — LABETALOL HYDROCHLORIDE 100 MG: 100 TABLET, FILM COATED ORAL at 21:46

## 2017-08-03 RX ADMIN — PROPOFOL 100 MG: 10 INJECTION, EMULSION INTRAVENOUS at 08:06

## 2017-08-03 RX ADMIN — LIDOCAINE HYDROCHLORIDE 40 MG: 20 INJECTION, SOLUTION EPIDURAL; INFILTRATION; INTRACAUDAL; PERINEURAL at 08:06

## 2017-08-03 NOTE — PROGRESS NOTES
Primary Nurse Hermila Ham and Janna Holguin RN performed a dual skin assessment on this patient Impairment noted- abrasion on right knee and ecchymosis on left leg. Demetrio score is 23.

## 2017-08-03 NOTE — PROGRESS NOTES
TRANSFER - IN REPORT:    Verbal report received from Negra(name) on Yocasta Garcia  being received from [email or web address] for routine post - op      Report consisted of patients Situation, Background, Assessment and   Recommendations(SBAR). Information from the following report(s) OR Summary was reviewed with the receiving nurse. Opportunity for questions and clarification was provided. Assessment completed upon patients arrival to unit and care assumed.

## 2017-08-03 NOTE — ROUTINE PROCESS
Patient: Vi Sepulveda MRN: [id number]  SSN: [id number]   YOB: 1923  Age: 80 y.o. Sex: female     Patient is status post Procedure(s): UMBILICAL HERNIA REPAIR. Surgeon(s) and Role:     * Shonda Garner MD - Primary    Local/Dose/Irrigation:  10 ml .5 marcaine with epi to umbilicus                  Peripheral IV 08/03/17 Left Forearm (Active)   Dressing Status New 8/3/2017  7:00 AM   Dressing Type Transparent 8/3/2017  7:00 AM   Hub Color/Line Status Infusing 8/3/2017  7:00 AM   Alcohol Cap Used Yes 8/3/2017  7:00 AM            Airway - Endotracheal Tube 08/03/17 Oral (Active)                   Dressing/Packing:  Wound Abdomen Left-DRESSING TYPE:  (none patient arrived with small abrasion) (08/03/17 0700)  Wound Leg Anterior;Right; Lower-DRESSING TYPE: 4 x 4;Special tape (comment) (paper tape) (08/03/17 8308)  Wound Umbilicus Anterior;Mid-DRESSING TYPE: Topical skin adhesive/glue (08/03/17 0700)  Splint/Cast:  ]    Other: Patient has left lower leg abrasions and mid abdomen abrasion all present on arrival.

## 2017-08-03 NOTE — OP NOTES
[address] [facility] [place] [address], [address]   OP NOTE       Name:  Su Rubinstein   MR#:  [id number]   :  1923   Account #:  [de-identified]    Surgery Date:  2017   Date of Adm:  2017       PREOPERATIVE DIAGNOSIS: Umbilical hernia. POSTOPERATIVE DIAGNOSIS: Umbilical hernia. PROCEDURES PERFORMED: Repair of umbilical hernia with   underlay patch. SURGEON: Shania Leal. Gabino Angel MD    ANESTHESIA: General, supplemented with 0.5% Marcaine with   epinephrine. FINDINGS: That of about a 2 cm defect which had been incarcerated   on several occasions. SPECIMENS REMOVED: None. ESTIMATED BLOOD LOSS: Minimal.    COMPLICATIONS: None. DRAINS: None. CONDITION: Good to the PACU. DESCRIPTION OF PROCEDURE: With the patient supine and   suitably anesthetized, the abdomen was prepared with ChloraPrep and   draped as a field. Marcaine 0.5% with epinephrine was infiltrated all   around the umbilicus. A small stab wound was made laterally and a curvilinear incision was   made with the Metzenbaum scissors. The umbilical skin was elevated   and  from the umbilical hernia itself. The hernia was then   traced down to its fascial edges, which were then cleared   circumferentially with cautery. The extraperitoneal dissection then   ensued, sweeping and using judicious cautery to really undermine the   fascia circumferentially. A medium Ventralex circular mesh was then   placed in position. The fascial defect was closed with interrupted 0   Ethibond sutures, several of which incorporated the anterior portion of   the mesh just to hold it in place. The subcutaneous tissues were   reapproximated with Vicryl. The entire area was then reinfiltrated with   0.5% Marcaine with epinephrine. The skin was closed with subcuticular   Monocryl, followed by Dermabond. At the termination of the procedure,   all counts were correct.      The patient tolerated this well and was brought to the PACU in   satisfactory condition. MD FERNANDA Mack / Elias Craig   D:  08/03/2017   08:48   T:  08/03/2017   09:04   Job #:  [id number]

## 2017-08-03 NOTE — IP AVS SNAPSHOT
[address] 
[phone] Patient: Rhea Rodriguez MRN: [id number]:[id number] You are allergic to the following Allergen Reactions Benicar (Olmesartan) Cough Cleocin (Clindamycin Hcl) Hives Hydrochlorothiazide Other (comments) GI upset - pancreatitis ? Keflex (Cephalexin) Hives Sulfa (Sulfonamide Antibiotics) Unknown (comments) Recent Documentation Height Weight Breastfeeding? BMI OB Status Smoking Status 1.626 m 60.3 kg No 22.83 kg/m2 Postmenopausal Former Smoker Emergency Contacts Name Discharge Info Relation Home Work Mobile [address] CAREGIVER [3] Child [2] [id number] Erika Cavazos  Child [2] [phone] About your hospitalization You were admitted on:  August 3, 2017 You last received care in the:  [place] [address] You were discharged on:  August 5, 2017 Unit phone number:  [phone] Why you were hospitalized Your primary diagnosis was:  Umbilical Hernia Providers Seen During Your Hospitalizations Provider Role Specialty Primary office phone Kitty Wetzel MD Attending Provider General Surgery [phone] Your Primary Care Physician (PCP) Primary Care Physician Office Phone Office Fax Susie Navarrete [phone] Follow-up Information Follow up With Details Comments Contact Info Abbey Meeks, [address] [place] [address] 57 
[phone] Your Appointments Monday August 21, 2017  3:50 PM EDT  
POST OP 10 MIN with MD Heather Tolentino [phone] ([address]) [address] [facility] [address] [phone] [phone] Current Discharge Medication List  
  
START taking these medications Dose & Instructions Dispensing Information Comments Morning Noon Evening Bedtime HYDROcodone-acetaminophen 5-325 mg per tablet Commonly known as:  Soln Code Your last dose was: Your next dose is:    
   
   
 Dose:  1 Tab Take 1 Tab by mouth every four (4) hours as needed for Pain. Max Daily Amount: 6 Tabs. Quantity:  20 Tab Refills:  0 ASK your doctor about these medications Dose & Instructions Dispensing Information Comments Morning Noon Evening Bedtime CARTIA  mg ER capsule Generic drug:  dilTIAZem CD Your last dose was: Your next dose is:    
   
   
 Dose:  180 mg Take 180 mg by mouth two (2) times a day. Refills:  0  
     
   
   
   
  
 FISH -1,000 mg capsule Generic drug:  fish oil-omega-3 fatty acids Your last dose was: Your next dose is:    
   
   
 Dose:  1 Cap Take 1 Cap by mouth daily. Refills:  0  
     
   
   
   
  
 labetalol 200 mg tablet Commonly known as:  Anabella Lin Your last dose was: Your next dose is: TAKE ONE TABLET BY MOUTH ONCE DAILY Quantity:  90 Tab Refills:  4  
     
   
   
   
  
 * OTHER Your last dose was: Your next dose is:    
   
   
 MACUGARD WITH SAFFRON OCULAR SUPPORT 1 DAILY Refills:  0  
     
   
   
   
  
 * OTHER Your last dose was: Your next dose is:    
   
   
 MACUGARD WITH SAFRON ASTAXANTHIN 1 DAILY Refills:  0  
     
   
   
   
  
 * OTHER Your last dose was: Your next dose is:    
   
   
 DAILY ADVANTAGE  MULTINUTRIENT DAILY SUPPLEMENT 1 DAILY Refills:  0  
     
   
   
   
  
 VITAMINS AND MINERALS tablet Generic drug:  therapeutic multivitamin-minerals Your last dose was: Your next dose is:    
   
   
 Dose:  1 Tab Take 1 Tab by mouth. Refills:  0 * Notice: This list has 3 medication(s) that are the same as other medications prescribed for you. Read the directions carefully, and ask your doctor or other care provider to review them with you. Where to Get Your Medications Information on where to get these meds will be given to you by the nurse or doctor. ! Ask your nurse or doctor about these medications HYDROcodone-acetaminophen 5-325 mg per tablet Discharge Instructions Abdominal Hernia Repair: What to Expect at Home Your Recovery After surgery to repair your hernia, you are likely to have pain for a few days. You may also feel like you have the flu, and you may have a low fever and feel tired and nauseated. This is common. You should feel better after a few days and will probably feel much better in 7 days. For several weeks you may feel twinges or pulling in the hernia repair when you move. You may have some bruising around the area of your hernia repair. This is normal. 
This care sheet gives you a general idea about how long it will take for you to recover. But each person recovers at a different pace. Follow the steps below to get better as quickly as possible. How can you care for yourself at home? Activity · Rest when you feel tired. Getting enough sleep will help you recover. · Try to walk each day. Start by walking a little more than you did the day before. Bit by bit, increase the amount you walk. Walking boosts blood flow and helps prevent pneumonia and constipation. · If your doctor gives you an abdominal binder to wear, use it as directed. This is an elastic bandage that wraps around your belly and upper hips. It helps support your belly muscles after surgery. · Avoid strenuous activities, such as biking, jogging, weight lifting, or aerobic exercise, until your doctor says it is okay. · Avoid lifting anything that would make you strain.  This may include heavy grocery bags and milk containers, a heavy briefcase or backpack, cat litter or dog food bags, a vacuum , or a child. · Ask your doctor when you can drive again. · Most people are able to return to work within 1 to 2 weeks after surgery. But if your job requires that you to do heavy lifting or strenuous activity, you may need to take 4 to 6 weeks off from work. · You may shower 24 to 48 hours after surgery, if your doctor okays it. Pat the cut (incision) dry. Do not take a bath for the first 2 weeks, or until your doctor tells you it is okay. · Ask your doctor when it is okay for you to have sex. Diet · You can eat your normal diet. If your stomach is upset, try bland, low-fat foods like plain rice, broiled chicken, toast, and yogurt. · Drink plenty of fluids (unless your doctor tells you not to). · You may notice that your bowel movements are not regular right after your surgery. This is common. Avoid constipation and straining with bowel movements. You may want to take a fiber supplement every day. If you have not had a bowel movement after a couple of days, ask your doctor about taking a mild laxative. Medicines · Your doctor will tell you if and when you can restart your medicines. He or she will also give you instructions about taking any new medicines. · If you take blood thinners, such as warfarin (Coumadin), clopidogrel (Plavix), or aspirin, be sure to talk to your doctor. He or she will tell you if and when to start taking those medicines again. Make sure that you understand exactly what your doctor wants you to do. · Be safe with medicines. Take pain medicines exactly as directed. ¨ If the doctor gave you a prescription medicine for pain, take it as prescribed. ¨ If you are not taking a prescription pain medicine, ask your doctor if you can take an over-the-counter medicine. · If your doctor prescribed antibiotics, take them as directed.  Do not stop taking them just because you feel better. You need to take the full course of antibiotics. · If you think your pain medicine is making you sick to your stomach: 
¨ Take your medicine after meals (unless your doctor has told you not to). ¨ Ask your doctor for a different pain medicine. Incision care · If you have strips of tape on the cut (incision) the doctor made, leave the tape on for a week or until it falls off. Or follow your doctor's instructions for removing the tape. · If you have staples closing the cut, you will need to visit your doctor in 1 to 2 weeks to have them removed. · Wash the area daily with warm, soapy water, and pat it dry. Don't use hydrogen peroxide or alcohol, which can slow healing. You may cover the area with a gauze bandage if it weeps or rubs against clothing. Change the bandage every day. Other instructions · Hold a pillow over your incision when you cough or take deep breaths. This will support your belly and decrease your pain. · Do breathing exercises at home as instructed by your doctor. This will help prevent pneumonia. · If you had laparoscopic surgery, you may also have pain in your left shoulder. The pain usually lasts about a day or two. Follow-up care is a key part of your treatment and safety. Be sure to make and go to all appointments, and call your doctor if you are having problems. It's also a good idea to know your test results and keep a list of the medicines you take. When should you call for help? Call 911 anytime you think you may need emergency care. For example, call if: 
· You passed out (lost consciousness). · You have sudden chest pain and shortness of breath, or you cough up blood. · You have severe pain in your belly. Call your doctor now or seek immediate medical care if: 
· You are sick to your stomach and cannot keep fluids down. · You have signs of a blood clot, such as: 
¨ Pain in your calf, back of the knee, thigh, or groin. ¨ Redness and swelling in your leg or groin. · You have signs of infection, such as: 
¨ Increased pain, swelling, warmth, or redness. ¨ Red streaks leading from the incision. ¨ Pus draining from the incision. ¨ A fever. · You have trouble passing urine or stool, especially if you have mild pain or swelling in your lower belly. · Bright red blood has soaked through the bandage over your incision. Watch closely for changes in your health, and be sure to contact your doctor if: 
· Your swelling is getting worse. · Your swelling is not going down. · You still don't have a bowel movement after taking a laxative. Where can you learn more? Go to http://aaliyah-navneet.info/. Enter B577 in the search box to learn more about \"Abdominal Hernia Repair: What to Expect at Home. \" Current as of: 2016 Content Version: 11.3 © 5743-4746 [employer]. Care instructions adapted under license by [employer] (which disclaims liability or warranty for this information). If you have questions about a medical condition or this instruction, always ask your healthcare professional. [place] [address] any warranty or liability for your use of this information. Patient Discharge Instructions Keyla Robb / [id number] : 1923 Admitted 8/3/2017 Discharged: 8/3/2017 Take Home Medications · It is important that you take the medication exactly as they are prescribed. · Keep your medication in the bottles provided by the pharmacist and keep a list of the medication names, dosages, and times to be taken in your wallet. · Do not take other medications without consulting your doctor. What to do at [facility] Recommended diet: Regular Diet, Recommended activity: Activity as tolerated, may shower any time Follow-up Appointments Procedures  FOLLOW UP VISIT Appointment in: Two Weeks Standing Status:   Standing Number of Occurrences:   1 Order Specific Question:   Appointment in Answer: Two Weeks Information obtained by : 
I understand that if any problems occur once I am at home I am to contact my physician. I understand and acknowledge receipt of the instructions indicated above. Physician's or R.N.'s Signature                                                                  Date/Time Patient or Representative Signature                                                          Date/Time Discharge Orders None Introducing [facility]! Dear Michael Goes: 
Thank you for requesting a [employer] account. Our records indicate that you already have an active [employer] account. You can access your account anytime at [email or web address] Did you know that you can access your hospital and ER discharge instructions at any time in ETARGET? You can also review all of your test results from your hospital stay or ER visit. Additional Information If you have questions, please visit the Frequently Asked Questions section of the ET[employer] website at [email or web address] Remember, ETARGET is NOT to be used for urgent needs. For medical emergencies, dial 911. Now available from your iPhone and Android! General Information Please provide this summary of care documentation to your next provider. Patient Signature:  ____________________________________________________________ Date:  ____________________________________________________________  
  
Justin Milton Provider Signature:  ____________________________________________________________ Date:  ____________________________________________________________

## 2017-08-03 NOTE — PERIOP NOTES
Spoke with patients family member Brien Tan via cell phone and updated him on start of procedure, surgeon aware.

## 2017-08-03 NOTE — BRIEF OP NOTE
BRIEF OPERATIVE NOTE    Date of Procedure: 8/3/2017   Preoperative Diagnosis: UMBILICAL HERNIA  Postoperative Diagnosis: UMBILICAL HERNIA    Procedure(s): UMBILICAL HERNIA REPAIR  Surgeon(s) and Role:     * Aidee Devi MD - Primary         Assistant Staff:       Surgical Staff:  Circ-1: Kyra Yu RN  Scrub Tech-1: Marisa Archer  Surg Asst-1: Margarito Alejandre  Event Time In   Incision Start 0820   Incision Close      Anesthesia: General   Estimated Blood Loss: minimal  Specimens: * No specimens in log *   Findings: 2 cm defect   Complications: none  Implants:   Implant Name Type Inv.  Item Serial No.  Lot No. LRB No. Used Action   [employer] [address] [employer] --  - [facility]   [address] --  NA BARD DAVOL [id number] N/A 1 Implanted       [id number]

## 2017-08-03 NOTE — INTERVAL H&P NOTE
H&P Update:  Rhea Rodriguez was seen and examined. History and physical has been reviewed. The patient has been examined.  There have been no significant clinical changes since the completion of the originally dated History and Physical.    Signed By: Kitty Wetzel MD     August 3, 2017 6:37 AM

## 2017-08-03 NOTE — ANESTHESIA PREPROCEDURE EVALUATION
Anesthetic History   No history of anesthetic complications            Review of Systems / Medical History  Patient summary reviewed, nursing notes reviewed and pertinent labs reviewed    Pulmonary  Within defined limits      Sleep apnea           Neuro/Psych   Within defined limits           Cardiovascular  Within defined limits  Hypertension        Dysrhythmias            GI/Hepatic/Renal  Within defined limits              Endo/Other  Within defined limits      Arthritis     Other Findings              Physical Exam    Airway  Mallampati: II  TM Distance: > 6 cm  Neck ROM: normal range of motion   Mouth opening: Normal     Cardiovascular  Regular rate and rhythm,  S1 and S2 normal,  no murmur, click, rub, or gallop             Dental  No notable dental hx       Pulmonary  Breath sounds clear to auscultation               Abdominal  GI exam deferred       Other Findings            Anesthetic Plan    ASA: 3  Anesthesia type: general          Induction: Intravenous  Anesthetic plan and risks discussed with: Patient

## 2017-08-03 NOTE — DISCHARGE INSTRUCTIONS
Abdominal Hernia Repair: What to Expect at Home  Your Recovery  After surgery to repair your hernia, you are likely to have pain for a few days. You may also feel like you have the flu, and you may have a low fever and feel tired and nauseated. This is common. You should feel better after a few days and will probably feel much better in 7 days. For several weeks you may feel twinges or pulling in the hernia repair when you move. You may have some bruising around the area of your hernia repair. This is normal.  This care sheet gives you a general idea about how long it will take for you to recover. But each person recovers at a different pace. Follow the steps below to get better as quickly as possible. How can you care for yourself at home? Activity  · Rest when you feel tired. Getting enough sleep will help you recover. · Try to walk each day. Start by walking a little more than you did the day before. Bit by bit, increase the amount you walk. Walking boosts blood flow and helps prevent pneumonia and constipation. · If your doctor gives you an abdominal binder to wear, use it as directed. This is an elastic bandage that wraps around your belly and upper hips. It helps support your belly muscles after surgery. · Avoid strenuous activities, such as biking, jogging, weight lifting, or aerobic exercise, until your doctor says it is okay. · Avoid lifting anything that would make you strain. This may include heavy grocery bags and milk containers, a heavy briefcase or backpack, cat litter or dog food bags, a vacuum , or a child. · Ask your doctor when you can drive again. · Most people are able to return to work within 1 to 2 weeks after surgery. But if your job requires that you to do heavy lifting or strenuous activity, you may need to take 4 to 6 weeks off from work. · You may shower 24 to 48 hours after surgery, if your doctor okays it. Pat the cut (incision) dry.  Do not take a bath for the first 2 weeks, or until your doctor tells you it is okay. · Ask your doctor when it is okay for you to have sex. Diet  · You can eat your normal diet. If your stomach is upset, try bland, low-fat foods like plain rice, broiled chicken, toast, and yogurt. · Drink plenty of fluids (unless your doctor tells you not to). · You may notice that your bowel movements are not regular right after your surgery. This is common. Avoid constipation and straining with bowel movements. You may want to take a fiber supplement every day. If you have not had a bowel movement after a couple of days, ask your doctor about taking a mild laxative. Medicines  · Your doctor will tell you if and when you can restart your medicines. He or she will also give you instructions about taking any new medicines. · If you take blood thinners, such as warfarin (Coumadin), clopidogrel (Plavix), or aspirin, be sure to talk to your doctor. He or she will tell you if and when to start taking those medicines again. Make sure that you understand exactly what your doctor wants you to do. · Be safe with medicines. Take pain medicines exactly as directed. ¨ If the doctor gave you a prescription medicine for pain, take it as prescribed. ¨ If you are not taking a prescription pain medicine, ask your doctor if you can take an over-the-counter medicine. · If your doctor prescribed antibiotics, take them as directed. Do not stop taking them just because you feel better. You need to take the full course of antibiotics. · If you think your pain medicine is making you sick to your stomach:  ¨ Take your medicine after meals (unless your doctor has told you not to). ¨ Ask your doctor for a different pain medicine. Incision care  · If you have strips of tape on the cut (incision) the doctor made, leave the tape on for a week or until it falls off. Or follow your doctor's instructions for removing the tape.   · If you have staples closing the cut, you will need to visit your doctor in 1 to 2 weeks to have them removed. · Wash the area daily with warm, soapy water, and pat it dry. Don't use hydrogen peroxide or alcohol, which can slow healing. You may cover the area with a gauze bandage if it weeps or rubs against clothing. Change the bandage every day. Other instructions  · Hold a pillow over your incision when you cough or take deep breaths. This will support your belly and decrease your pain. · Do breathing exercises at home as instructed by your doctor. This will help prevent pneumonia. · If you had laparoscopic surgery, you may also have pain in your left shoulder. The pain usually lasts about a day or two. Follow-up care is a key part of your treatment and safety. Be sure to make and go to all appointments, and call your doctor if you are having problems. It's also a good idea to know your test results and keep a list of the medicines you take. When should you call for help? Call 911 anytime you think you may need emergency care. For example, call if:  · You passed out (lost consciousness). · You have sudden chest pain and shortness of breath, or you cough up blood. · You have severe pain in your belly. Call your doctor now or seek immediate medical care if:  · You are sick to your stomach and cannot keep fluids down. · You have signs of a blood clot, such as:  ¨ Pain in your calf, back of the knee, thigh, or groin. ¨ Redness and swelling in your leg or groin. · You have signs of infection, such as:  ¨ Increased pain, swelling, warmth, or redness. ¨ Red streaks leading from the incision. ¨ Pus draining from the incision. ¨ A fever. · You have trouble passing urine or stool, especially if you have mild pain or swelling in your lower belly. · Bright red blood has soaked through the bandage over your incision. Watch closely for changes in your health, and be sure to contact your doctor if:  · Your swelling is getting worse.   · Your swelling is not going down. · You still don't have a bowel movement after taking a laxative. Where can you learn more? Go to http://aaliyah-navneet.info/. Enter B577 in the search box to learn more about \"Abdominal Hernia Repair: What to Expect at Home. \"  Current as of: 2016  Content Version: 11.3  © 8742-1883 [employer]. Care instructions adapted under license by [employer] (which disclaims liability or warranty for this information). If you have questions about a medical condition or this instruction, always ask your healthcare professional. [place] [address] any warranty or liability for your use of this information. Patient Discharge Instructions    Salima Shanika / [id number] : 1923    Admitted 8/3/2017 Discharged: 8/3/2017     Take Home Medications            · It is important that you take the medication exactly as they are prescribed. · Keep your medication in the bottles provided by the pharmacist and keep a list of the medication names, dosages, and times to be taken in your wallet. · Do not take other medications without consulting your doctor. What to do at Home    Recommended diet: Regular Diet,     Recommended activity: Activity as tolerated, may shower any time          Follow-up Appointments   Procedures    FOLLOW UP VISIT Appointment in: Two Weeks     Standing Status:   Standing     Number of Occurrences:   1     Order Specific Question:   Appointment in     Answer: Two Weeks           Information obtained by :  I understand that if any problems occur once I am at home I am to contact my physician. I understand and acknowledge receipt of the instructions indicated above.                                                                                                                                            Physician's or R.N.'s Signature                                                                  Date/Time Patient or Representative Signature                                                          Date/Time

## 2017-08-03 NOTE — ANESTHESIA POSTPROCEDURE EVALUATION
Post-Anesthesia Evaluation and Assessment    Patient: Yocasta Garcia MRN: [id number]  SSN: [id number]    YOB: 1923  Age: 80 y.o. Sex: female       Cardiovascular Function/Vital Signs  Visit Vitals    /62    Pulse 76    Temp 36.2 °C (97.2 °F)    Resp 12    Ht 5' 4\" (1.626 m)    Wt 60.3 kg (133 lb)    SpO2 90%    BMI 22.83 kg/m2       Patient is status post general anesthesia for Procedure(s): UMBILICAL HERNIA REPAIR. Nausea/Vomiting: None    Postoperative hydration reviewed and adequate. Pain:  Pain Scale 1: Numeric (0 - 10) (08/03/17 0950)  Pain Intensity 1: 1 (08/03/17 0950)   Managed    Neurological Status:   Neuro (WDL): Within Defined Limits (08/03/17 0950)  Neuro  Neurologic State: Alert (08/03/17 0950)  LUE Motor Response: Purposeful (08/03/17 0950)  LLE Motor Response: Purposeful (08/03/17 0950)  RUE Motor Response: Purposeful (08/03/17 0950)  RLE Motor Response: Purposeful (08/03/17 0950)   At baseline    Mental Status and Level of Consciousness: Arousable    Pulmonary Status:   O2 Device: Room air (08/03/17 0950)   Adequate oxygenation and airway patent    Complications related to anesthesia: None    Post-anesthesia assessment completed.  No concerns    Signed By: Jarod Martinez MD     August 3, 2017

## 2017-08-03 NOTE — PROGRESS NOTES
TRANSFER - OUT REPORT:    Verbal report given to Fernando Heath RN(name) on Ravi Paul  being transferred to 517(unit) for routine post - op       Report consisted of patients Situation, Background, Assessment and   Recommendations(SBAR). Time Pre op antibiotic given:8:02 Levofloxin  Anesthesia Stop time: 9:06  Herman Present on Transfer to floor:no  Order for Herman on Chart:no    Information from the following report(s) SBAR, Kardex, OR Summary, Procedure Summary, Intake/Output and MAR was reviewed with the receiving nurse. Opportunity for questions and clarification was provided. Is the patient on 02? NO       L/Min        Other     Is the patient on a monitor? NO    Is the nurse transporting with the patient? NO    Surgical Waiting Area notified of patient's transfer from PACU? YES      The following personal items collected during your admission accompanied patient upon transfer:   Dental Appliance: Dental Appliances: None  Vision: Visual Aid: Glasses, At home  Hearing Aid:    Jewelry: Jewelry: None  Clothing: Clothing: With patient (bag of clothes and cane returned to patient)  Other Valuables:  Other Valuables: Cane  Valuables sent to safe:

## 2017-08-03 NOTE — H&P (VIEW-ONLY)
Subjective:      Bashir Pratt is a 80 y.o.  female who presents today to discuss umbilical hernia surgery. The hernia popped out again last night and she went to the ER in [facility], three minutes before the doctor came into the room, it spontaneously reduced. Chief Complaint   Patient presents with    Umbilical Hernia     Discuss Hernia Surgery. Patient Active Problem List    Diagnosis Date Noted    Umbilical hernia 96/73/0699    Essential hypertension 05/03/2015    Deaf 08/27/2013    PAF (paroxysmal atrial fibrillation) ([facility] [address] 07/31/2011     Past Medical History:   Diagnosis Date    HTN (hypertension) 7/31/2011    PAF (paroxysmal atrial fibrillation) ([facility] [address] 7/82/3072    Umbilical hernia 0/51/7163      Past Surgical History:   Procedure Laterality Date    HX CATARACT REMOVAL      HX KNEE ARTHROSCOPY        Social History   Substance Use Topics    Smoking status: Former Smoker    Smokeless tobacco: Never Used    Alcohol use Yes      History reviewed. No pertinent family history. Prior to Admission medications    Medication Sig Start Date End Date Taking? Authorizing Provider   therapeutic multivitamin-minerals (VITAMINS AND MINERALS) tablet Take 1 Tab by mouth. Yes Historical Provider   dilTIAZem CD (CARDIZEM CD) 180 mg ER capsule Take 180 mg by mouth. 8/10/16  Yes Historical Provider   labetalol (NORMODYNE) 200 mg tablet TAKE ONE TABLET BY MOUTH ONCE DAILY 4/25/17  Yes DOMINIQUE Boudreaux MD   multivitamin (ONE A DAY) tablet Take 1 Tab by mouth daily. Yes Historical Provider   polyethylene glycol (MIRALAX) 17 gram/dose powder Take 17 g by mouth. 7/25/17   Historical Provider   senna (SENOKOT) 8.6 mg tablet Take 8.6 mg by mouth. 7/25/17   Historical Provider   cloNIDine (CATAPRES) 0.1 mg/24 hr patch 1 Patch by TransDERmal route.  7/7/16   Historical Provider   labetalol (NORMODYNE) 200 mg tablet Take 1 Tab by mouth. 4/25/17   Historical Provider   diltiazem CD (CARDIZEM CD) 240 mg ER capsule Take 1 Cap by mouth daily. 8/10/16   Molina Ramírez MD   cloNIDine (CATAPRES) 0.1 mg/24 hr patch 1 Patch by TransDERmal route every seven (7) days. 7/7/16   Molina Ramírez MD     Allergies   Allergen Reactions    Benicar [Olmesartan] Cough    Cleocin [Clindamycin Hcl] Hives    Hydrochlorothiazide Other (comments)     GI upset - pancreatitis ?  Keflex [Cephalexin] Hives    Sulfa (Sulfonamide Antibiotics) Unknown (comments)         Review of Systems:    A comprehensive review of systems was negative except for that written in the History of Present Illness. Objective:     Visit Vitals    /68 (BP 1 Location: Left arm, BP Patient Position: Sitting)    Pulse 68    Temp 97.9 °F (36.6 °C) (Oral)    Resp 16    Ht 5' 4\" (1.626 m)    Wt 134 lb (60.8 kg)    SpO2 94%    BMI 23 kg/m2       Physical Exam:  General appearance: alert, cooperative, no distress, appears stated age  Head: Normocephalic, without obvious abnormality, atraumatic  Lungs: clear to auscultation bilaterally  Heart: regular rate and rhythm, S1, S2 normal, no murmur, click, rub or gallop  Abdomen: There is a tender and reducible, 6.6MK umbilical hernia. Neurologic: Grossly normal      Assessment:       ICD-10-CM ICD-9-CM    1. Umbilical hernia without obstruction and without gangrene K42.9 553.1        Plan: Will repair the umbilical hernia with observation overnight in the hospital.    Written by juli Segovia, as dictated by Stacey Mathews. Angelito Leal MD.    Total face to face time with patient: 9 minutes. Greater than 50% of the time was spent in counseling. Signed By: Austin Leal MD    July 26, 2017

## 2017-08-04 PROCEDURE — 74011250637 HC RX REV CODE- 250/637: Performed by: NURSE PRACTITIONER

## 2017-08-04 PROCEDURE — 74011250637 HC RX REV CODE- 250/637: Performed by: SURGERY

## 2017-08-04 PROCEDURE — G8978 MOBILITY CURRENT STATUS: HCPCS

## 2017-08-04 PROCEDURE — G8979 MOBILITY GOAL STATUS: HCPCS

## 2017-08-04 PROCEDURE — 97161 PT EVAL LOW COMPLEX 20 MIN: CPT

## 2017-08-04 PROCEDURE — 99218 HC RM OBSERVATION: CPT

## 2017-08-04 PROCEDURE — 74011250636 HC RX REV CODE- 250/636: Performed by: NURSE PRACTITIONER

## 2017-08-04 RX ORDER — HYDROCODONE BITARTRATE AND ACETAMINOPHEN 7.5; 325 MG/1; MG/1
1 TABLET ORAL
Status: DISCONTINUED | OUTPATIENT
Start: 2017-08-04 | End: 2017-08-05 | Stop reason: HOSPADM

## 2017-08-04 RX ORDER — ONDANSETRON 2 MG/ML
4 INJECTION INTRAMUSCULAR; INTRAVENOUS
Status: DISCONTINUED | OUTPATIENT
Start: 2017-08-04 | End: 2017-08-05 | Stop reason: HOSPADM

## 2017-08-04 RX ORDER — KETOROLAC TROMETHAMINE 30 MG/ML
15 INJECTION, SOLUTION INTRAMUSCULAR; INTRAVENOUS
Status: DISCONTINUED | OUTPATIENT
Start: 2017-08-04 | End: 2017-08-05 | Stop reason: HOSPADM

## 2017-08-04 RX ORDER — AMOXICILLIN 250 MG
1 CAPSULE ORAL DAILY
Status: DISCONTINUED | OUTPATIENT
Start: 2017-08-04 | End: 2017-08-05 | Stop reason: HOSPADM

## 2017-08-04 RX ADMIN — Medication 10 ML: at 21:06

## 2017-08-04 RX ADMIN — KETOROLAC TROMETHAMINE 15 MG: 30 INJECTION, SOLUTION INTRAMUSCULAR at 15:50

## 2017-08-04 RX ADMIN — DILTIAZEM HYDROCHLORIDE 180 MG: 180 CAPSULE, COATED, EXTENDED RELEASE ORAL at 21:03

## 2017-08-04 RX ADMIN — ONDANSETRON 4 MG: 2 INJECTION INTRAMUSCULAR; INTRAVENOUS at 15:25

## 2017-08-04 RX ADMIN — DILTIAZEM HYDROCHLORIDE 180 MG: 180 CAPSULE, COATED, EXTENDED RELEASE ORAL at 10:25

## 2017-08-04 RX ADMIN — HYDROCODONE BITARTRATE AND ACETAMINOPHEN 1 TABLET: 5; 325 TABLET ORAL at 03:54

## 2017-08-04 RX ADMIN — Medication 10 ML: at 15:16

## 2017-08-04 RX ADMIN — HYDROCODONE BITARTRATE AND ACETAMINOPHEN 1 TABLET: 7.5; 325 TABLET ORAL at 15:16

## 2017-08-04 RX ADMIN — ONDANSETRON 4 MG: 2 INJECTION INTRAMUSCULAR; INTRAVENOUS at 09:22

## 2017-08-04 RX ADMIN — LABETALOL HYDROCHLORIDE 100 MG: 100 TABLET, FILM COATED ORAL at 15:16

## 2017-08-04 RX ADMIN — DOCUSATE SODIUM AND SENNOSIDES 1 TABLET: 8.6; 5 TABLET, FILM COATED ORAL at 15:16

## 2017-08-04 RX ADMIN — LABETALOL HYDROCHLORIDE 100 MG: 100 TABLET, FILM COATED ORAL at 21:02

## 2017-08-04 RX ADMIN — HYDROCODONE BITARTRATE AND ACETAMINOPHEN 1 TABLET: 5; 325 TABLET ORAL at 10:25

## 2017-08-04 NOTE — PROGRESS NOTES
Daily Progress Note  [facility] at [address] Date: 8/3/2017  Post-Operative Day: 1 from Procedure(s): UMBILICAL HERNIA REPAIR     Subjective:     Last 24 hrs: Having a lot of nausea and abdominal distention. Pain not well controlled, very difficult for her to get OOB. Only able to take about 4 steps. Had a few bites for breakfast.  No flatus yet. C/o frequent urination and needing to use undergarment. At home she is continent. Really dislikes shared room. Objective:     Blood pressure 184/70, pulse 75, temperature 98.1 °F (36.7 °C), resp. rate 16, height 5' 4\" (1.626 m), weight 60.3 kg (133 lb), SpO2 95 %, not currently breastfeeding. Temp (24hrs), Av.2 °F (36.8 °C), Min:98 °F (36.7 °C), Max:98.5 °F (36.9 °C)      _____________________  Physical Exam:     Alert and Oriented, lying in bed, no acute distress. Cardiovascular: RRR, no peripheral edema  Lungs:CTAB   Abdomen: quiet, moderate distention, tympanitic, soft, appropriately tender. Incision healing well. Assessment:   Principal Problem:    Umbilical hernia ()            Plan:     Consult PT  Adjust pain medication  DC IVF  Await return of bowel function  Home when doing better          Arpan Dutta, ACNP - [address] [facility] at [facility] [address],  [place] [place], [address] [place], [place]  [phone]    Data Review:    Recent Labs      17   1528   WBC  5.4   HGB  12.0   HCT  36.0   PLT  220     Recent Labs      17   1528   NA  138   K  4.2   CL  103   CO2  30   GLU  141*   BUN  22*   CREA  0.81   CA  8.6   ALB  3.0*   TBILI  0.3   SGOT  15   ALT  18     No results for input(s): AML, LPSE in the last 72 hours.         ______________________  Medications:    Current Facility-Administered Medications   Medication Dose Route Frequency    ondansetron (ZOFRAN) injection 4 mg  4 mg IntraVENous Q4H PRN    HYDROcodone-acetaminophen (NORCO) 7.5-325 mg per tablet 1 Tab  1 Tab Oral Q4H PRN    dilTIAZem CD (CARDIZEM CD) capsule 180 mg  180 mg Oral BID    sodium chloride (NS) flush 5-10 mL  5-10 mL IntraVENous Q8H    sodium chloride (NS) flush 5-10 mL  5-10 mL IntraVENous PRN    acetaminophen (TYLENOL) tablet 650 mg  650 mg Oral Q4H PRN    HYDROcodone-acetaminophen (NORCO) 5-325 mg per tablet 1 Tab  1 Tab Oral Q4H PRN    HYDROmorphone (PF) (DILAUDID) injection 0.5 mg  0.5 mg IntraVENous Q3H PRN    labetalol (NORMODYNE) tablet 100 mg  100 mg Oral ACD    And    labetalol (NORMODYNE) tablet 100 mg  100 mg Oral QHS                                                                                               ATTENDING ADDENDUM  I supervised the APC and reviewed the note. We discussed the plan of care  Pain is a lot better. Still somewhat distended. Will keep til things turn around.

## 2017-08-04 NOTE — PROGRESS NOTES
Bedside shift change report given to [address] [place] [address] Avenue (oncoming nurse) by Felicita Salas RN (offgoing nurse). Report included the following information SBAR, Kardex, Intake/Output, MAR and Recent Results.

## 2017-08-04 NOTE — PROGRESS NOTES
Problem: Mobility Impaired (Adult and Pediatric)  Goal: *Acute Goals and Plan of Care (Insert Text)  Physical Therapy Goals  Initiated 8/4/2017  1. Patient will move from supine to sit and sit to supine , scoot up and down and roll side to side in bed with supervision/set-up within 7 day(s). 2. Patient will transfer from bed to chair and chair to bed with supervision/set-up using the least restrictive device within 7 day(s). 3. Patient will perform sit to stand with minimal assistance/contact guard assist within 7 day(s). 4. Patient will ambulate with supervision/set-up for 150 feet with the least restrictive device within 7 day(s). 5. Patient will ascend/descend 4 stairs with 1 handrail(s) with minimal assistance/contact guard assist within 7 day(s). PHYSICAL THERAPY EVALUATION  Patient: Onesimo Vega (39 y.o. female)  Date: 8/4/2017  Primary Diagnosis: UMBILICAL HERNIA  Umbilical hernia  Procedure(s) (LRB):  UMBILICAL HERNIA REPAIR (N/A) 1 Day Post-Op   Precautions:          ASSESSMENT :  Based on the objective data described below, the patient presents with decreased LE strength, increased abdominal pain and nausea, and overall decreased independence with mobility following admission for umbilical hernia repair POD 1. PTA patient was independent and lived with her sons. Currently, severly limited by abdominal pain and nausea; agreeable to sit EOB only. Required mod A for supine to sit. Only able to tolerate sitting EOB x 3 minutes. Attempted to encourage sit to stand and sidestep to get to [facility] and patient continued to decline. Patient is declining rehab and plans to discharge home with HHPT. Patient will benefit from skilled intervention to address the above impairments.   Patients rehabilitation potential is considered to be Good  Factors which may influence rehabilitation potential include:   [ ]         None noted  [ ]         Mental ability/status  [X]         Medical condition  [ ] Home/family situation and support systems  [ ]         Safety awareness  [ ]         Pain tolerance/management  [ ]         Other:        PLAN :  Recommendations and Planned Interventions:  [X]           Bed Mobility Training             [X]    Neuromuscular Re-Education  [X]           Transfer Training                   [ ]    Orthotic/Prosthetic Training  [X]           Gait Training                         [ ]    Modalities  [X]           Therapeutic Exercises           [ ]    Edema Management/Control  [X]           Therapeutic Activities            [X]    Patient and Family Training/Education  [ ]           Other (comment):     Frequency/Duration: Patient will be followed by physical therapy  5 times a week to address goals. Discharge Recommendations: rehab vs HHPT, patient refusing rehab  Further Equipment Recommendations for Discharge: TBD       SUBJECTIVE:   Patient stated I can't do anymore.       OBJECTIVE DATA SUMMARY:   HISTORY:    Past Medical History:   Diagnosis Date    Adverse effect of anesthesia       SLOW WAKING FROM ANESTHESIA    Arthritis      HTN (hypertension) 7/31/2011    Ill-defined condition       USES O2 2.5L AT NIGHT    Ill-defined condition       EGD WITH PANCREATITIS    PAF (paroxysmal atrial fibrillation) ([facility] [address] 7/31/2011    Sleep apnea       NO CPAP USES O2    Umbilical hernia 9/64/1449     Past Surgical History:   Procedure Laterality Date    CARDIAC SURG PROCEDURE UNLIST         CARDIAC CATH    HX CATARACT REMOVAL Bilateral 2007    HX GI   2009     EGD    HX KNEE ARTHROSCOPY Left 1970'S     X3    HX KNEE ARTHROSCOPY Right 1970'S     X2     Prior Level of Function/Home Situation: independent  Personal factors and/or comorbidities impacting plan of care:      Home Situation  Home Environment: Private residence  # Steps to Enter: 0  One/Two Story Residence: Two story, live on 1st floor  # of Interior Steps: 15 (Elevator in home)  Height of Each Step (in): 6 inches  [employer] Rails: Both  Lift Chair Available: No  Living Alone: No  Support Systems: Family member(s), Friends \ neighbors, Child(joe)  Patient Expects to be Discharged to[de-identified] Private residence  Current DME Used/Available at Home: [employer]     EXAMINATION/PRESENTATION/DECISION MAKING:   Critical Behavior:  Neurologic State: Alert           Hearing: Auditory  Auditory Impairment: Hard of hearing, bilateral  Hearing Aids/Status: Bilateral  Skin:    Edema:   Range Of Motion:  AROM: Within functional limits           PROM: Within functional limits           Strength:    Strength: Generally decreased, functional                    Tone & Sensation:                                  Coordination:     Vision:      Functional Mobility:  Bed Mobility:  Rolling: Minimum assistance  Supine to Sit: Moderate assistance;Assist x1  Sit to Supine: Minimum assistance; Additional time     Transfers:  Sit to Stand:  (declined)                          Balance:   Sitting: Intact; With support  Standing:  (refused)  Ambulation/Gait Training:              Gait Description (WDL):  (refused)                                                                Stairs: Therapeutic Exercises:         Functional Measure:  Timed up and go:      Timed Get Up And Go Test:  (unable)      Timed Up and Go and G-code impairment scale:  Percentage of Impairment CH     0%    CI     1-19% CJ     20-39% CK     40-59% CL     60-79% CM     80-99% CN      100%   Timed   Score 0-56 10 11-12 13-14 15-16 17-18 19 20          < than 10 seconds=Normal  Greater then 13.5 seconds (in elderly)=Increased fall risk   Arabella Mccann [place] M. Predicting the probability for falls in community dwelling older adults using the Timed Up and Go Test. Phys Ther. 2000;80:896-903. G codes:   In compliance with CMSs Claims Based Outcome Reporting, the following G-code set was chosen for this patient based on their primary functional limitation being treated: The outcome measure chosen to determine the severity of the functional limitation was the TUG with a score of UNABLE which was correlated with the impairment scale. · Mobility - Walking and Moving Around:               - CURRENT STATUS:    CN - 100% impaired, limited or restricted               - GOAL STATUS:           CM - 80%-99% impaired, limited or restricted               - D/C STATUS:                       ---------------To be determined---------------            Pain:  Pain Scale 1: Numeric (0 - 10)  Pain Intensity 1: 3  Pain Location 1: Abdomen  Pain Orientation 1: Mid  Pain Description 1: Aching  Pain Intervention(s) 1: Medication (see MAR)  Activity Tolerance:   Limited by pain  Please refer to the flowsheet for vital signs taken during this treatment. After treatment:   [ ]         Patient left in no apparent distress sitting up in chair  [X]         Patient left in no apparent distress in bed  [X]         Call bell left within reach  [X]         Nursing notified  [ ]         Caregiver present  [ ]         Bed alarm activated      COMMUNICATION/EDUCATION:   The patients plan of care was discussed with: Registered Nurse.  [X]         Fall prevention education was provided and the patient/caregiver indicated understanding. [X]         Patient/family have participated as able in goal setting and plan of care. [X]         Patient/family agree to work toward stated goals and plan of care. [ ]         Patient understands intent and goals of therapy, but is neutral about his/her participation. [ ]         Patient is unable to participate in goal setting and plan of care.      Thank you for this referral.  Gemini Geiger, PT   Time Calculation: 10 mins

## 2017-08-04 NOTE — PROGRESS NOTES
Reviewed medical chart; met with the patient at the bedside along her son, Martinez Kuo. Patient was seen at this visit for umbilical hernia. Patient lives with her other two sons, ANALILIA and Phyllis Sinha. She resides on the first floor of the home. She uses a cane or walker to ambulate. She has a PCP - Dr. Queen Quintana and gets her prescriptions at [facility]. Her sons plan to hire personal care aids to assist the patient for the next few days; provided a list of personal care agencies in the event that they need a list.  Martinez Kuo will drive her home. Care Management will continue to follow her disposition. HALI Day    Care Management Interventions  PCP Verified by CM:  Yes  Palliative Care Consult (Criteria: CHF and RRAT>21): No  Mode of Transport at Discharge:  (Patient will travel home via car.  )  Maryanne Barrerat: No  Discharge Durable Medical Equipment: No  Physical Therapy Consult: No  Occupational Therapy Consult: No  Speech Therapy Consult: No  Current Support Network: Relative's Home (Patient lives with her two sons, ANALILIA and Phyllis Sinha.  )  Confirm Follow Up Transport:  (Patient will travel home via car.  )  Plan discussed with Pt/Family/Caregiver: Yes  Freedom of Choice Offered: Yes  Discharge Location  Discharge Placement: Home with family assistance

## 2017-08-05 VITALS
HEIGHT: 64 IN | OXYGEN SATURATION: 94 % | DIASTOLIC BLOOD PRESSURE: 59 MMHG | TEMPERATURE: 98.2 F | BODY MASS INDEX: 22.71 KG/M2 | SYSTOLIC BLOOD PRESSURE: 157 MMHG | HEART RATE: 60 BPM | RESPIRATION RATE: 16 BRPM | WEIGHT: 133 LBS

## 2017-08-05 LAB
ANION GAP BLD CALC-SCNC: 8 MMOL/L (ref 5–15)
BUN SERPL-MCNC: 11 MG/DL (ref 6–20)
BUN/CREAT SERPL: 17 (ref 12–20)
CALCIUM SERPL-MCNC: 8 MG/DL (ref 8.5–10.1)
CHLORIDE SERPL-SCNC: 98 MMOL/L (ref 97–108)
CO2 SERPL-SCNC: 27 MMOL/L (ref 21–32)
CREAT SERPL-MCNC: 0.64 MG/DL (ref 0.55–1.02)
ERYTHROCYTE [DISTWIDTH] IN BLOOD BY AUTOMATED COUNT: 13.5 % (ref 11.5–14.5)
GLUCOSE SERPL-MCNC: 98 MG/DL (ref 65–100)
HCT VFR BLD AUTO: 32.3 % (ref 35–47)
HGB BLD-MCNC: 10.8 G/DL (ref 11.5–16)
MAGNESIUM SERPL-MCNC: 1.7 MG/DL (ref 1.6–2.4)
MCH RBC QN AUTO: 30.1 PG (ref 26–34)
MCHC RBC AUTO-ENTMCNC: 33.4 G/DL (ref 30–36.5)
MCV RBC AUTO: 90 FL (ref 80–99)
PHOSPHATE SERPL-MCNC: 3.7 MG/DL (ref 2.6–4.7)
PLATELET # BLD AUTO: 186 K/UL (ref 150–400)
POTASSIUM SERPL-SCNC: 3.5 MMOL/L (ref 3.5–5.1)
RBC # BLD AUTO: 3.59 M/UL (ref 3.8–5.2)
SODIUM SERPL-SCNC: 133 MMOL/L (ref 136–145)
WBC # BLD AUTO: 6.6 K/UL (ref 3.6–11)

## 2017-08-05 PROCEDURE — 80048 BASIC METABOLIC PNL TOTAL CA: CPT | Performed by: NURSE PRACTITIONER

## 2017-08-05 PROCEDURE — 36415 COLL VENOUS BLD VENIPUNCTURE: CPT | Performed by: NURSE PRACTITIONER

## 2017-08-05 PROCEDURE — 74011250637 HC RX REV CODE- 250/637: Performed by: NURSE PRACTITIONER

## 2017-08-05 PROCEDURE — 74011250637 HC RX REV CODE- 250/637: Performed by: SURGERY

## 2017-08-05 PROCEDURE — 97116 GAIT TRAINING THERAPY: CPT

## 2017-08-05 PROCEDURE — 99218 HC RM OBSERVATION: CPT

## 2017-08-05 PROCEDURE — 83735 ASSAY OF MAGNESIUM: CPT | Performed by: NURSE PRACTITIONER

## 2017-08-05 PROCEDURE — 85027 COMPLETE CBC AUTOMATED: CPT | Performed by: NURSE PRACTITIONER

## 2017-08-05 PROCEDURE — 84100 ASSAY OF PHOSPHORUS: CPT | Performed by: NURSE PRACTITIONER

## 2017-08-05 PROCEDURE — 74011250636 HC RX REV CODE- 250/636: Performed by: NURSE PRACTITIONER

## 2017-08-05 RX ADMIN — DILTIAZEM HYDROCHLORIDE 180 MG: 180 CAPSULE, COATED, EXTENDED RELEASE ORAL at 07:10

## 2017-08-05 RX ADMIN — DOCUSATE SODIUM AND SENNOSIDES 1 TABLET: 8.6; 5 TABLET, FILM COATED ORAL at 09:38

## 2017-08-05 RX ADMIN — Medication 10 ML: at 07:10

## 2017-08-05 RX ADMIN — KETOROLAC TROMETHAMINE 15 MG: 30 INJECTION, SOLUTION INTRAMUSCULAR at 12:15

## 2017-08-05 NOTE — PROGRESS NOTES
Problem: Mobility Impaired (Adult and Pediatric)  Goal: *Acute Goals and Plan of Care (Insert Text)  Physical Therapy Goals  Initiated 8/4/2017  1. Patient will move from supine to sit and sit to supine , scoot up and down and roll side to side in bed with supervision/set-up within 7 day(s). 2. Patient will transfer from bed to chair and chair to bed with supervision/set-up using the least restrictive device within 7 day(s). 3. Patient will perform sit to stand with minimal assistance/contact guard assist within 7 day(s). 4. Patient will ambulate with supervision/set-up for 150 feet with the least restrictive device within 7 day(s). 5. Patient will ascend/descend 4 stairs with 1 handrail(s) with minimal assistance/contact guard assist within 7 day(s). PHYSICAL THERAPY TREATMENT  Patient: Russ Rojas (42 y.o. female)  Date: 8/5/2017  Diagnosis: UMBILICAL HERNIA  Umbilical hernia Umbilical hernia  Procedure(s) (LRB):  UMBILICAL HERNIA REPAIR (N/A) 2 Days Post-Op  Precautions:        ASSESSMENT:  Pt cleared by nsg. Pt able to mobilize to edge of bed with min assist.  Pt able to gait 80 feet with RW, supervision level. Pt lives with both sons who are home all day, has a care taker several hours a day. Pt is safe with supervision and cleared for discharge from PT perspective. Patient is cleared for discharge from PT standpoint:  YES [X]     NO [ ]   Progression toward goals:  [X]    Improving appropriately and progressing toward goals  [ ]    Improving slowly and progressing toward goals  [ ]    Not making progress toward goals and plan of care will be adjusted       PLAN:  Patient continues to benefit from skilled intervention to address the above impairments. Continue treatment per established plan of care.   Discharge Recommendations:  None  Further Equipment Recommendations for Discharge:  Has RW          SUBJECTIVE:   Patient stated I want to get out of here!!!        OBJECTIVE DATA SUMMARY: Critical Behavior:  Neurologic State: Alert           Functional Mobility Training:  Bed Mobility:      min assist         Transfers:      CGA               Balance:  Sitting: Intact  Standing: Intact; With support  Ambulation/Gait Training:  Distance (ft): 80 Feet (ft)  Assistive Device: Walker, rolling           Gait Abnormalities: Decreased step clearance        Base of Support: Widened            Pain:  Pain Scale 1: Numeric (0 - 10)  Pain Intensity 1: 5  Pain Location 1: Abdomen  Pain Orientation 1: Left  Pain Description 1: Sore  Pain Intervention(s) 1: Medication (see MAR)  Activity Tolerance:   good  Please refer to the flowsheet for vital signs taken during this treatment.   After treatment:   [ ]    Patient left in no apparent distress sitting up in chair  [X]    Patient left in no apparent distress in bed  [X]    Call bell left within reach  [X]    Nursing notified  [ ]    Caregiver present  [ ]    Bed alarm activated      COMMUNICATION/COLLABORATION:   The patients plan of care was discussed with: Registered Nurse     Bill Messina, PT   Time Calculation: 19 mins

## 2017-08-05 NOTE — PROGRESS NOTES
Subjective  Passed gas and is feeling much better  tolerating diet     Temp:  [97.2 °F (36.2 °C)-99 °F (37.2 °C)]   Pulse (Heart Rate):  [55-97]   BP: (122-198)/(50-78)   Resp Rate:  [11-18]   O2 Sat (%):  [90 %-98 %]   Weight:  [133 lb (60.3 kg)]      08/03 1901 - 08/05 0700  In: 1036.3 [I.V.:1036.3]  Out: -       Objective  Gen- Alert in NAD  Lungs- CTA  H- RRR  Abd- s/mild distension   Incision healing well. Recent Results (from the past 24 hour(s))   CBC W/O DIFF    Collection Time: 08/05/17  4:35 AM   Result Value Ref Range    WBC 6.6 3.6 - 11.0 K/uL    RBC 3.59 (L) 3.80 - 5.20 M/uL    HGB 10.8 (L) 11.5 - 16.0 g/dL    HCT 32.3 (L) 35.0 - 47.0 %    MCV 90.0 80.0 - 99.0 FL    MCH 30.1 26.0 - 34.0 PG    MCHC 33.4 30.0 - 36.5 g/dL    RDW 13.5 11.5 - 14.5 %    PLATELET 478 321 - 052 K/uL   METABOLIC PANEL, BASIC    Collection Time: 08/05/17  4:35 AM   Result Value Ref Range    Sodium 133 (L) 136 - 145 mmol/L    Potassium 3.5 3.5 - 5.1 mmol/L    Chloride 98 97 - 108 mmol/L    CO2 27 21 - 32 mmol/L    Anion gap 8 5 - 15 mmol/L    Glucose 98 65 - 100 mg/dL    BUN 11 6 - 20 MG/DL    Creatinine 0.64 0.55 - 1.02 MG/DL    BUN/Creatinine ratio 17 12 - 20      GFR est AA >60 >60 ml/min/1.73m2    GFR est non-AA >60 >60 ml/min/1.73m2    Calcium 8.0 (L) 8.5 - 10.1 MG/DL   MAGNESIUM    Collection Time: 08/05/17  4:35 AM   Result Value Ref Range    Magnesium 1.7 1.6 - 2.4 mg/dL   PHOSPHORUS    Collection Time: 08/05/17  4:35 AM   Result Value Ref Range    Phosphorus 3.7 2.6 - 4.7 MG/DL         Principal Problem:    Umbilical hernia (1/01/3352)          Assessment & Plan  S/p Umbilical hernia repair- doing much better  Will Discharge home  Pt does have 24 hour care for the next week at home.

## 2017-08-06 NOTE — PROGRESS NOTES
I have reviewed discharge instructions with the patient and son. The patient and son verbalized understanding. RX for Norco given to son.

## 2017-08-21 ENCOUNTER — OFFICE VISIT (OUTPATIENT)
Dept: SURGERY | Age: 82
End: 2017-08-21

## 2017-08-21 VITALS
TEMPERATURE: 97.7 F | WEIGHT: 133 LBS | DIASTOLIC BLOOD PRESSURE: 70 MMHG | HEIGHT: 64 IN | RESPIRATION RATE: 16 BRPM | HEART RATE: 67 BPM | BODY MASS INDEX: 22.71 KG/M2 | OXYGEN SATURATION: 96 % | SYSTOLIC BLOOD PRESSURE: 140 MMHG

## 2017-08-21 DIAGNOSIS — Z09 POSTOPERATIVE EXAMINATION: Primary | ICD-10-CM

## 2017-08-21 NOTE — PROGRESS NOTES
Post-Op Visit    Subjective:     Sandie Walker is a 80 y.o.  female s/p Repair of umbilical hernia with underlay patch from 08/03/2017 who presents for post-op care. Pt only took one pain pill. Appetite is fine. Eating a regular diet without difficulty. Bowel movements are regular.    Denies fever, nausea, shortness of breath, chest pain, redness at incision site, vomiting and diarrhea      Chief Complaint   Patient presents with    Surgical Follow-up     S/P 2/4/73 repair of umbilical hernia with underlay patch       Patient Active Problem List    Diagnosis Date Noted    Umbilical hernia 30/17/4531    Essential hypertension 05/03/2015    Deaf 08/27/2013    PAF (paroxysmal atrial fibrillation) ([address] 07/31/2011     Past Medical History:   Diagnosis Date    Adverse effect of anesthesia     SLOW WAKING FROM ANESTHESIA    Arthritis     HTN (hypertension) 7/31/2011    Ill-defined condition     USES O2 2.5L AT NIGHT    Ill-defined condition     EGD WITH PANCREATITIS    PAF (paroxysmal atrial fibrillation) ([address] 7/31/2011    Sleep apnea     NO CPAP USES O2    Umbilical hernia 5/68/2142      Past Surgical History:   Procedure Laterality Date    CARDIAC SURG PROCEDURE UNLIST      CARDIAC CATH    HX CATARACT REMOVAL Bilateral 2007    HX GI  2009    EGD    HX HERNIA REPAIR  08/03/2017    repair of umbilical hernia with underlay patch-[facility]-Dr. Shaheed Marr    HX KNEE ARTHROSCOPY Left 1970'S    X3    HX KNEE ARTHROSCOPY Right 1970'S    X2      Social History   Substance Use Topics    Smoking status: Former Smoker     Packs/day: 1.00     Years: 10.00     Quit date: 8/1/1965    Smokeless tobacco: Never Used    Alcohol use Yes      Family History   Problem Relation Age of Onset    Stroke Mother     Kidney Disease Father     Cancer Sister      KIDNEY    No Known Problems Brother     Stroke Sister     Other Sister      VASCULAR DISEASE    Anesth Problems Neg Hx       Prior to Admission medications    Medication Sig Start Date End Date Taking? Authorizing Provider   dilTIAZem CD (CARTIA XT) 180 mg ER capsule Take 180 mg by mouth two (2) times a day. Yes Historical Provider   fish oil-omega-3 fatty acids (FISH OIL) 340-1,000 mg capsule Take 1 Cap by mouth daily. Yes Historical Provider   OTHER MACUGARD WITH SAFFRON OCULAR SUPPORT 1 DAILY   Yes Historical Provider   OTHER MACUGARD WITH SAFRON ASTAXANTHIN 1 DAILY   Yes Historical Provider   OTHER DAILY ADVANTAGE  MULTINUTRIENT DAILY SUPPLEMENT 1 DAILY   Yes Historical Provider   therapeutic multivitamin-minerals (VITAMINS AND MINERALS) tablet Take 1 Tab by mouth. Yes Historical Provider   labetalol (NORMODYNE) 200 mg tablet TAKE ONE TABLET BY MOUTH ONCE DAILY  Patient taking differently: 100 mg two (2) times a day. PATIENT STATES SHE TAKES MED AT 4PM AND BEDTIME. PATIENT STATES IF BP GREATER THAN 150 SHE TAKES 4PM DOSE IF LESS THAN 150 SHE ONLY TAKES BEDTIME DOSE. 4/25/17  Yes DOMINIQUE Dorantes MD   HYDROcodone-acetaminophen [facility]) 5-325 mg per tablet Take 1 Tab by mouth every four (4) hours as needed for Pain. Max Daily Amount: 6 Tabs. 8/3/17   Alita Meckel, MD     Allergies   Allergen Reactions    Benicar [Olmesartan] Cough    Cleocin [Clindamycin Hcl] Hives    Hydrochlorothiazide Other (comments)     GI upset - pancreatitis ?  Keflex [Cephalexin] Hives    Sulfa (Sulfonamide Antibiotics) Unknown (comments)         Review of Systems:    A comprehensive review of systems was negative except for that written in the History of Present Illness.     Objective:     Visit Vitals    /70 (BP 1 Location: Left arm, BP Patient Position: Sitting)    Pulse 67    Temp 97.7 °F (36.5 °C) (Oral)    Resp 16    Ht 5' 4\" (1.626 m)    Wt 133 lb (60.3 kg)    SpO2 96%    BMI 22.83 kg/m2       Physical Exam:  General appearance: alert, cooperative, no distress, appears stated age  Head: Normocephalic, without obvious abnormality, atraumatic  Abdomen: Incision(s) is (are) healing nicely. Neurologic: Grossly normal      Assessment:       ICD-10-CM ICD-9-CM    1. Postoperative examination Z09 V67.00        Plan:     Pt will f/u PRN. Written by juli Jordan, as dictated by Vincenzo Carrera MD.    Total face to face time with patient: 3 minutes. Greater than 50% of the time was spent in counseling. Signed By: Vincenzo Carrera MD    August 21, 2017

## 2017-08-21 NOTE — MR AVS SNAPSHOT
Visit Information Date & Time Provider Department Dept. Phone Encounter #  
 8/21/2017  3:50 PM [place], [address] [facility] [phone] [id number] Upcoming Health Maintenance Date Due DTaP/Tdap/Td series (1 - Tdap) 5/20/1944 GLAUCOMA SCREENING Q2Y 5/20/1988 OSTEOPOROSIS SCREENING (DEXA) 5/20/1988 MEDICARE YEARLY EXAM 5/20/1988 Pneumococcal 65+ Low/Medium Risk (2 of 2 - PPSV23) 7/31/1997 INFLUENZA AGE 9 TO ADULT 8/1/2017 Allergies as of 8/21/2017  Review Complete On: 8/21/2017 By: [place] MD  
  
 Severity Noted Reaction Type Reactions Benicar [Olmesartan]  07/31/2011    Cough Cleocin [Clindamycin Hcl]  02/08/2017    Hives Hydrochlorothiazide  08/02/2014    Other (comments) GI upset - pancreatitis ? Keflex [Cephalexin]  02/08/2017    Hives Sulfa (Sulfonamide Antibiotics)  07/31/2011    Unknown (comments) Current Immunizations  Reviewed on 8/27/2013 Name Date ZZZ-RETIRED (DO NOT USE) Pneumococcal Vaccine (Unspecified Type) 7/31/1992 Zoster Vaccine, Live 8/27/2007 Not reviewed this visit You Were Diagnosed With   
  
 Codes Comments Postoperative examination    -  Primary ICD-10-CM: F55 ICD-9-CM: V67.00 Vitals BP Pulse Temp Resp Height(growth percentile) Weight(growth percentile) 140/70 (BP 1 Location: Left arm, BP Patient Position: Sitting) 67 97.7 °F (36.5 °C) (Oral) 16 5' 4\" (1.626 m) 133 lb (60.3 kg) SpO2 BMI OB Status Smoking Status 96% 22.83 kg/m2 Postmenopausal Former Smoker BMI and BSA Data Body Mass Index Body Surface Area  
 22.83 kg/m 2 1.65 m 2 Preferred Pharmacy Pharmacy Name Phone [phone] [phone] Your Updated Medication List  
  
   
This list is accurate as of: 8/21/17  4:51 PM.  Always use your most recent med list.  
  
  
  
  
 CARTIA  mg ER capsule Generic drug:  dilTIAZem CD Take 180 mg by mouth two (2) times a day. FISH -1,000 mg capsule Generic drug:  fish oil-omega-3 fatty acids Take 1 Cap by mouth daily. HYDROcodone-acetaminophen 5-325 mg per tablet Commonly known as:  Nader [place] Take 1 Tab by mouth every four (4) hours as needed for Pain. Max Daily Amount: 6 Tabs. labetalol 200 mg tablet Commonly known as:  NORMODYNE  
TAKE ONE TABLET BY MOUTH ONCE DAILY  
  
 * OTHER  
MACUGARD WITH SAFFRON OCULAR SUPPORT 1 DAILY  
  
 * OTHER  
MACUGARD WITH SAFRON ASTAXANTHIN 1 DAILY  
  
 * OTHER  
DAILY ADVANTAGE  MULTINUTRIENT DAILY SUPPLEMENT 1 DAILY  
  
 VITAMINS AND MINERALS tablet Generic drug:  therapeutic multivitamin-minerals Take 1 Tab by mouth. * Notice: This list has 3 medication(s) that are the same as other medications prescribed for you. Read the directions carefully, and ask your doctor or other care provider to review them with you. Introducing [facility]! Dear Katelin Pink: 
Thank you for requesting a [employer] account. Our records indicate that you already have an active [employer] account. You can access your account anytime at [email or web address] Did you know that you can access your hospital and ER discharge instructions at any time in [employer]? You can also review all of your test results from your hospital stay or ER visit. Additional Information If you have questions, please visit the Frequently Asked Questions section of the [employer] website at [email or web address] Remember, [employer] is NOT to be used for urgent needs. For medical emergencies, dial 911. Now available from your iPhone and Android! Please provide this summary of care documentation to your next provider. Your primary care clinician is listed as DOMINIQUE Toure. If you have any questions after today's visit, please call [phone].

## 2020-11-12 ENCOUNTER — HOSPITAL ENCOUNTER (OUTPATIENT)
Dept: PREADMISSION TESTING | Age: 85
Discharge: HOME | End: 2020-11-12
Payer: MEDICARE

## 2020-11-12 ENCOUNTER — TRANSCRIBE ORDER (OUTPATIENT)
Dept: REGISTRATION | Age: 85
End: 2020-11-12

## 2020-11-12 DIAGNOSIS — Z01.812 PRE-PROCEDURE LAB EXAM: Primary | ICD-10-CM

## 2020-11-12 DIAGNOSIS — Z01.812 PRE-PROCEDURE LAB EXAM: ICD-10-CM

## 2020-11-12 PROCEDURE — 87635 SARS-COV-2 COVID-19 AMP PRB: CPT

## 2020-11-13 ENCOUNTER — ANESTHESIA EVENT (OUTPATIENT)
Dept: MEDSURG UNIT | Age: 85
End: 2020-11-13
Payer: MEDICARE

## 2020-11-13 LAB — SARS-COV-2, COV2NT: NOT DETECTED

## 2020-11-14 RX ORDER — FENTANYL CITRATE 50 UG/ML
25 INJECTION, SOLUTION INTRAMUSCULAR; INTRAVENOUS
Status: CANCELLED | OUTPATIENT
Start: 2020-11-14

## 2020-11-14 RX ORDER — OXYCODONE AND ACETAMINOPHEN 5; 325 MG/1; MG/1
1 TABLET ORAL AS NEEDED
Status: CANCELLED | OUTPATIENT
Start: 2020-11-14

## 2020-11-14 RX ORDER — MORPHINE SULFATE 10 MG/ML
2 INJECTION, SOLUTION INTRAMUSCULAR; INTRAVENOUS
Status: CANCELLED | OUTPATIENT
Start: 2020-11-14

## 2020-11-14 RX ORDER — SODIUM CHLORIDE 0.9 % (FLUSH) 0.9 %
5-40 SYRINGE (ML) INJECTION EVERY 8 HOURS
Status: CANCELLED | OUTPATIENT
Start: 2020-11-14

## 2020-11-14 RX ORDER — HYDROMORPHONE HYDROCHLORIDE 1 MG/ML
0.2 INJECTION, SOLUTION INTRAMUSCULAR; INTRAVENOUS; SUBCUTANEOUS
Status: CANCELLED | OUTPATIENT
Start: 2020-11-14

## 2020-11-14 RX ORDER — MIDAZOLAM HYDROCHLORIDE 1 MG/ML
0.5 INJECTION, SOLUTION INTRAMUSCULAR; INTRAVENOUS
Status: CANCELLED | OUTPATIENT
Start: 2020-11-14

## 2020-11-14 RX ORDER — SODIUM CHLORIDE, SODIUM LACTATE, POTASSIUM CHLORIDE, CALCIUM CHLORIDE 600; 310; 30; 20 MG/100ML; MG/100ML; MG/100ML; MG/100ML
125 INJECTION, SOLUTION INTRAVENOUS CONTINUOUS
Status: CANCELLED | OUTPATIENT
Start: 2020-11-14

## 2020-11-14 RX ORDER — ONDANSETRON 2 MG/ML
4 INJECTION INTRAMUSCULAR; INTRAVENOUS AS NEEDED
Status: CANCELLED | OUTPATIENT
Start: 2020-11-14

## 2020-11-14 RX ORDER — SODIUM CHLORIDE 0.9 % (FLUSH) 0.9 %
5-40 SYRINGE (ML) INJECTION AS NEEDED
Status: CANCELLED | OUTPATIENT
Start: 2020-11-14

## 2020-11-14 RX ORDER — DIPHENHYDRAMINE HYDROCHLORIDE 50 MG/ML
12.5 INJECTION, SOLUTION INTRAMUSCULAR; INTRAVENOUS AS NEEDED
Status: CANCELLED | OUTPATIENT
Start: 2020-11-14 | End: 2020-11-14

## 2020-11-16 ENCOUNTER — ANESTHESIA (OUTPATIENT)
Dept: MEDSURG UNIT | Age: 85
End: 2020-11-16
Payer: MEDICARE

## 2020-11-16 ENCOUNTER — HOSPITAL ENCOUNTER (OUTPATIENT)
Age: 85
Setting detail: OUTPATIENT SURGERY
Discharge: HOME | End: 2020-11-16
Attending: SURGERY | Admitting: SURGERY
Payer: MEDICARE

## 2020-11-16 VITALS
BODY MASS INDEX: 22.86 KG/M2 | DIASTOLIC BLOOD PRESSURE: 53 MMHG | HEART RATE: 71 BPM | HEIGHT: 63 IN | SYSTOLIC BLOOD PRESSURE: 181 MMHG | OXYGEN SATURATION: 95 % | RESPIRATION RATE: 21 BRPM | TEMPERATURE: 97.7 F | WEIGHT: 129 LBS

## 2020-11-16 PROCEDURE — 88332 PATH CONSLTJ SURG EA ADD BLK: CPT

## 2020-11-16 PROCEDURE — 77030018836 HC SOL IRR NACL ICUM -A: Performed by: SURGERY

## 2020-11-16 PROCEDURE — 76210000034 HC AMBSU PH I REC 0.5 TO 1 HR: Performed by: SURGERY

## 2020-11-16 PROCEDURE — 74011000250 HC RX REV CODE- 250: Performed by: SURGERY

## 2020-11-16 PROCEDURE — 77030040922 HC BLNKT HYPOTHRM STRY -A

## 2020-11-16 PROCEDURE — 77030002986 HC SUT PROL J&J -A: Performed by: SURGERY

## 2020-11-16 PROCEDURE — 77030031139 HC SUT VCRL2 J&J -A: Performed by: SURGERY

## 2020-11-16 PROCEDURE — 88305 TISSUE EXAM BY PATHOLOGIST: CPT

## 2020-11-16 PROCEDURE — 77030002916 HC SUT ETHLN J&J -A: Performed by: SURGERY

## 2020-11-16 PROCEDURE — 77030040361 HC SLV COMPR DVT MDII -B: Performed by: SURGERY

## 2020-11-16 PROCEDURE — 76030000003 HC AMB SURG OR TIME 1.5 TO 2: Performed by: SURGERY

## 2020-11-16 PROCEDURE — 2709999900 HC NON-CHARGEABLE SUPPLY: Performed by: SURGERY

## 2020-11-16 PROCEDURE — 76060000063 HC AMB SURG ANES 1.5 TO 2 HR: Performed by: SURGERY

## 2020-11-16 PROCEDURE — 74011250636 HC RX REV CODE- 250/636: Performed by: NURSE ANESTHETIST, CERTIFIED REGISTERED

## 2020-11-16 PROCEDURE — 74011250636 HC RX REV CODE- 250/636: Performed by: ANESTHESIOLOGY

## 2020-11-16 PROCEDURE — 74011250637 HC RX REV CODE- 250/637: Performed by: ANESTHESIOLOGY

## 2020-11-16 PROCEDURE — 77030029488 HC ELECTRD PAD DEFIB ZOLL -B: Performed by: SURGERY

## 2020-11-16 PROCEDURE — 2709999900 HC NON-CHARGEABLE SUPPLY

## 2020-11-16 PROCEDURE — 88331 PATH CONSLTJ SURG 1 BLK 1SPC: CPT

## 2020-11-16 RX ORDER — SODIUM CHLORIDE 9 MG/ML
25 INJECTION, SOLUTION INTRAVENOUS CONTINUOUS
Status: DISCONTINUED | OUTPATIENT
Start: 2020-11-16 | End: 2020-11-16 | Stop reason: HOSPADM

## 2020-11-16 RX ORDER — SODIUM CHLORIDE, SODIUM LACTATE, POTASSIUM CHLORIDE, CALCIUM CHLORIDE 600; 310; 30; 20 MG/100ML; MG/100ML; MG/100ML; MG/100ML
125 INJECTION, SOLUTION INTRAVENOUS CONTINUOUS
Status: DISCONTINUED | OUTPATIENT
Start: 2020-11-16 | End: 2020-11-16 | Stop reason: HOSPADM

## 2020-11-16 RX ORDER — CEFAZOLIN SODIUM 1 G/3ML
INJECTION, POWDER, FOR SOLUTION INTRAMUSCULAR; INTRAVENOUS AS NEEDED
Status: DISCONTINUED | OUTPATIENT
Start: 2020-11-16 | End: 2020-11-16 | Stop reason: HOSPADM

## 2020-11-16 RX ORDER — BACITRACIN 500 [USP'U]/G
OINTMENT TOPICAL AS NEEDED
Status: DISCONTINUED | OUTPATIENT
Start: 2020-11-16 | End: 2020-11-16 | Stop reason: HOSPADM

## 2020-11-16 RX ORDER — SODIUM CHLORIDE 0.9 % (FLUSH) 0.9 %
5-40 SYRINGE (ML) INJECTION AS NEEDED
Status: DISCONTINUED | OUTPATIENT
Start: 2020-11-16 | End: 2020-11-16 | Stop reason: HOSPADM

## 2020-11-16 RX ORDER — ONDANSETRON 2 MG/ML
INJECTION INTRAMUSCULAR; INTRAVENOUS AS NEEDED
Status: DISCONTINUED | OUTPATIENT
Start: 2020-11-16 | End: 2020-11-16 | Stop reason: HOSPADM

## 2020-11-16 RX ORDER — LIDOCAINE HYDROCHLORIDE 10 MG/ML
0.1 INJECTION, SOLUTION EPIDURAL; INFILTRATION; INTRACAUDAL; PERINEURAL AS NEEDED
Status: DISCONTINUED | OUTPATIENT
Start: 2020-11-16 | End: 2020-11-16 | Stop reason: HOSPADM

## 2020-11-16 RX ORDER — FENTANYL CITRATE 50 UG/ML
50 INJECTION, SOLUTION INTRAMUSCULAR; INTRAVENOUS AS NEEDED
Status: DISCONTINUED | OUTPATIENT
Start: 2020-11-16 | End: 2020-11-16 | Stop reason: HOSPADM

## 2020-11-16 RX ORDER — PROPOFOL 10 MG/ML
INJECTION, EMULSION INTRAVENOUS AS NEEDED
Status: DISCONTINUED | OUTPATIENT
Start: 2020-11-16 | End: 2020-11-16 | Stop reason: HOSPADM

## 2020-11-16 RX ORDER — ACETAMINOPHEN 325 MG/1
650 TABLET ORAL ONCE
Status: COMPLETED | OUTPATIENT
Start: 2020-11-16 | End: 2020-11-16

## 2020-11-16 RX ORDER — FENTANYL CITRATE 50 UG/ML
INJECTION, SOLUTION INTRAMUSCULAR; INTRAVENOUS AS NEEDED
Status: DISCONTINUED | OUTPATIENT
Start: 2020-11-16 | End: 2020-11-16 | Stop reason: HOSPADM

## 2020-11-16 RX ORDER — PROPOFOL 10 MG/ML
INJECTION, EMULSION INTRAVENOUS
Status: DISCONTINUED | OUTPATIENT
Start: 2020-11-16 | End: 2020-11-16 | Stop reason: HOSPADM

## 2020-11-16 RX ORDER — MIDAZOLAM HYDROCHLORIDE 1 MG/ML
1 INJECTION, SOLUTION INTRAMUSCULAR; INTRAVENOUS AS NEEDED
Status: DISCONTINUED | OUTPATIENT
Start: 2020-11-16 | End: 2020-11-16 | Stop reason: HOSPADM

## 2020-11-16 RX ORDER — SODIUM CHLORIDE 0.9 % (FLUSH) 0.9 %
5-40 SYRINGE (ML) INJECTION EVERY 8 HOURS
Status: DISCONTINUED | OUTPATIENT
Start: 2020-11-16 | End: 2020-11-16 | Stop reason: HOSPADM

## 2020-11-16 RX ADMIN — FENTANYL CITRATE 25 MCG: 50 INJECTION, SOLUTION INTRAMUSCULAR; INTRAVENOUS at 08:24

## 2020-11-16 RX ADMIN — FENTANYL CITRATE 12.5 MCG: 50 INJECTION, SOLUTION INTRAMUSCULAR; INTRAVENOUS at 07:40

## 2020-11-16 RX ADMIN — ACETAMINOPHEN 650 MG: 325 TABLET ORAL at 07:19

## 2020-11-16 RX ADMIN — FENTANYL CITRATE 12.5 MCG: 50 INJECTION, SOLUTION INTRAMUSCULAR; INTRAVENOUS at 08:01

## 2020-11-16 RX ADMIN — PROPOFOL 10 MG: 10 INJECTION, EMULSION INTRAVENOUS at 07:41

## 2020-11-16 RX ADMIN — PROPOFOL 10 MG: 10 INJECTION, EMULSION INTRAVENOUS at 07:38

## 2020-11-16 RX ADMIN — SODIUM CHLORIDE, SODIUM LACTATE, POTASSIUM CHLORIDE, AND CALCIUM CHLORIDE 125 ML/HR: 600; 310; 30; 20 INJECTION, SOLUTION INTRAVENOUS at 07:04

## 2020-11-16 RX ADMIN — ONDANSETRON HYDROCHLORIDE 4 MG: 2 INJECTION, SOLUTION INTRAMUSCULAR; INTRAVENOUS at 08:00

## 2020-11-16 RX ADMIN — FENTANYL CITRATE 25 MCG: 50 INJECTION, SOLUTION INTRAMUSCULAR; INTRAVENOUS at 07:35

## 2020-11-16 RX ADMIN — FENTANYL CITRATE 12.5 MCG: 50 INJECTION, SOLUTION INTRAMUSCULAR; INTRAVENOUS at 08:54

## 2020-11-16 RX ADMIN — FENTANYL CITRATE 12.5 MCG: 50 INJECTION, SOLUTION INTRAMUSCULAR; INTRAVENOUS at 08:38

## 2020-11-16 RX ADMIN — PROPOFOL 25 MCG/KG/MIN: 10 INJECTION, EMULSION INTRAVENOUS at 07:35

## 2020-11-16 RX ADMIN — CEFAZOLIN 2 G: 330 INJECTION, POWDER, FOR SOLUTION INTRAMUSCULAR; INTRAVENOUS at 07:50

## 2020-11-16 NOTE — BRIEF OP NOTE
Brief Postoperative Note    Patient: So Kimball  YOB: 1923  MRN: [id number]    Date of Procedure: 11/16/2020     Pre-Op Diagnosis: SCCA LOWER AND UPPER RIGHT LIMB    Post-Op Diagnosis: Same as preoperative diagnosis. Procedure(s):  EXCISION SQUAMOUS CELL CARCINOMA RIGHT ANTERIOR LOWER LEG WITH FASCIOCUTANEOUS FLAP  ,  EXCISION SQUAMOUS CELL CARCINOMA RIGHT UPPER THIGH WITH FLAP CLOSURE AND FROZEN SECTIONS    Surgeon(s):   Roseann Mayfield MD    Surgical Assistant: None    Anesthesia: General     Estimated Blood Loss (mL): less than 50     Complications: None    Specimens:   ID Type Source Tests Collected by Time Destination   1 : SCCA RIGHT LOWER LEG Frozen Section Tissue  Roseann Mayfield MD 11/16/2020 5427 Pathology   2 : SCCA RIGHT UPPER THIGH Frozen Section Tissue  Roseann Mayfield MD 11/16/2020 2397 Pathology        Implants: * No implants in log *    Drains: * No LDAs found *    Findings: clear margins on frozen section on both lesions    Electronically Signed by Jonatan Patrick MD on 11/16/2020 at 9:11 AM

## 2020-11-16 NOTE — DISCHARGE INSTRUCTIONS
Prudence Atkinson M.D., F.A.C.S. Kayode Solomon M.D., F.A.C.S.,  Rubén Rose III, M.D., F.A.C.S. Ashok West M.D.,  Natalia Cook M.D. Instructions after Plastic Surgery Procedures      You have had a  surgical procedure. Please follow these simple instructions to help ensure a safe and comfortable recovery. Any questions can be directed to the office at [phone]. 1. If a bandage has been placed, it can be removed or changed at 24-48 hours after surgery. Wounds of the scalp are not usually bandaged, except in special situations. 2. Expect a modest amount of redness (inflammation) and possibly some bruising to appear in the days following your surgery. This is normal and will resolve as the wound heals, but may persist until the stitches have been removed. 3. The appearance of excessive wound redness, pus or fever is not normal and should be reported to the office. 4. Wounds may be gently washed with mild soap and water beginning 24 hours after surgery, then patted dry. Scalp wounds may be gently washed (mild shampoo) and gently dried as well. 5. Antibiotic ointment should be lightly applied 2-3 times per day to any wound. Replace Band-Aid or other dressing as instructed. 6. Suture removal will be arranged in 5-14 days, depending upon the site. The pathologists report will be discussed with you then. Your appointment is _______________________. 7. Mild to moderate pain is to be expected after minor surgery and will generally be relieved by the use of Tylenol or ibuprofen agents (Advil, Motrin, Nuprin, etc.) You have been given a prescription for ______________________. 8. Swelling or discomfort will be improved by elevating the surgical site above the level of the heart, especially the face, scalp or arms, which can be elevated in bed by extra pillows.     9. Do not be concerned if one or even several sutures come out prior to the time of suture removal. It is not unusual for this to occur as the wound swelling decreases. Support of the remaining sutures is sufficient to protect your wound(s). 10. If appropriate, copies of the surgery and pathology reports will be sent to your doctor. 11. Please call the office at [phone] if you have any questions. I acknowledge receipt of the above discharge instructions:    Patient/Guardian Signature _______________________________________ Date___________________    Correne Cordon Signature_______________________________________________ Date___________________    DISCHARGE SUMMARY from Nurse    You received 650 mg of tylenol (acetaminophen) during your procedure today at 7:30 AM. Please do not have any additional tylenol (acetaminophen) or tylenol containing products for 4 hours, or no sooner than 11:30 AM.    PATIENT INSTRUCTIONS:    After general anesthesia or intravenous sedation, for 24 hours or while taking prescription Narcotics:  · Limit your activities  · Do not drive and operate hazardous machinery  · Do not make important personal or business decisions  · Do  not drink alcoholic beverages  · If you have not urinated within 8 hours after discharge, please contact your surgeon on call. Report the following to your surgeon:  · Excessive pain, swelling, redness or odor of or around the surgical area  · Temperature over 100.5  · Nausea and vomiting lasting longer than 4 hours or if unable to take medications  · Any signs of decreased circulation or nerve impairment to extremity: change in color, persistent  numbness, tingling, coldness or increase pain  · Any questions    What to do at Home:  Recommended activity: See surgical instructions. If you experience any of the following symptoms as noted above, please follow up with Dr. Zoey Tobias. *  Please give a list of your current medications to your Primary Care Provider.     *  Please update this list whenever your medications are discontinued, doses are      changed, or new medications (including over-the-counter products) are added. *  Please carry medication information at all times in case of emergency situations. These are general instructions for a healthy lifestyle:    No smoking/ No tobacco products/ Avoid exposure to second hand smoke  Surgeon General's Warning:  Quitting smoking now greatly reduces serious risk to your health. Obesity, smoking, and sedentary lifestyle greatly increases your risk for illness    A healthy diet, regular physical exercise & weight monitoring are important for maintaining a healthy lifestyle    You may be retaining fluid if you have a history of heart failure or if you experience any of the following symptoms:  Weight gain of 3 pounds or more overnight or 5 pounds in a week, increased swelling in our hands or feet or shortness of breath while lying flat in bed. Please call your doctor as soon as you notice any of these symptoms; do not wait until your next office visit. The discharge information has been reviewed with the patient and caregiver. The patient and caregiver verbalized understanding. Discharge medications reviewed with the patient and caregiver and appropriate educational materials and side effects teaching were provided.   ___________________________________________________________________________________________________________________________________

## 2020-11-16 NOTE — ANESTHESIA POSTPROCEDURE EVALUATION
Post-Anesthesia Evaluation and Assessment    Patient: Juan A Vines MRN: [id number]  SSN: [id number]    YOB: 1923  Age: 80 y.o. Sex: female      I have evaluated the patient and they are stable and ready for discharge from the PACU. Cardiovascular Function/Vital Signs  Visit Vitals  BP (!) 157/58   Pulse 60   Temp 36.5 °C (97.7 °F)   Resp 13   Ht 5' 3\" (1.6 m)   Wt 58.5 kg (129 lb)   SpO2 100%   BMI 22.85 kg/m²       Patient is status post General anesthesia for Procedure(s):  EXCISION SQUAMOUS CELL CARCINOMA RIGHT ANTERIOR LOWER LEG WITH FASCIOCUTANEOUS FLAP  ,  EXCISION SQUAMOUS CELL CARCINOMA RIGHT UPPER THIGH WITH FLAP CLOSURE AND FROZEN SECTIONS. Nausea/Vomiting: None    Postoperative hydration reviewed and adequate. Pain:  Pain Scale 1: Numeric (0 - 10) (11/16/20 7764)  Pain Intensity 1: 0 (11/16/20 4640)   Managed    Neurological Status:   Neuro (WDL): Within Defined Limits (11/16/20 0702)   At baseline    Mental Status, Level of Consciousness: Alert and  oriented to person, place, and time    Pulmonary Status:   O2 Device: Nasal cannula (11/16/20 0920)   Adequate oxygenation and airway patent    Complications related to anesthesia: None    Post-anesthesia assessment completed.  No concerns    Signed By: Cici Rangel MD     November 16, 2020

## 2020-11-16 NOTE — ROUTINE PROCESS
Patient: Alireza Garcia MRN: [id number]  SSN: [id number]   YOB: 1923  Age: 80 y.o. Sex: female     Patient is status post Procedure(s):  EXCISION SQUAMOUS CELL CARCINOMA RIGHT ANTERIOR LOWER LEG WITH FASCIOCUTANEOUS FLAP OR SPLIT THICKNESS SKIN GRAFT,  EXCISION SQUAMOUS CELL CARCINOMA RIGHT UPPER THIGH WITH FLAP CLOSURE AND FROZEN SECTIONS.     Surgeon(s) and Role:     * Dotty Phan MD - Primary    Local/Dose/Irrigation: SEE MAR                  Peripheral IV 11/16/20 Right Wrist (Active)   Site Assessment Clean, dry, & intact 11/16/20 0704   Dressing Status Clean, dry, & intact 11/16/20 0704   Dressing Type Transparent 11/16/20 0704                           Dressing/Packing:       Splint/Cast:  ]    Other:

## 2020-11-16 NOTE — H&P
Pre-op History and Physical    CC: SCCA LOWER LEFT LIMB   HPI: 80y.o. year old female with SCCA LOWER LEFT LIMB for Procedure(s):  EXCISION SQUAMOUS CELL CARCINOMA RIGHT ANTERIOR LOWER LEG WITH FASCIOCUTANEOUS FLAP OR SPLIT THICKNESS SKIN GRAFT,  EXCISION SQUAMOUS CELL CARCINOMA RIGHT UPPER THIGH WITH FLAP CLOSURE AND FROZEN SECTIONS.   Past medical history:   Past Medical History:   Diagnosis Date    Adverse effect of anesthesia     SLOW WAKING FROM ANESTHESIA    Arthritis     HTN (hypertension) 7/31/2011    Ill-defined condition     USES O2 2.5L AT NIGHT    Ill-defined condition     EGD WITH PANCREATITIS    PAF (paroxysmal atrial fibrillation) ([address] 7/31/2011    Sleep apnea     NO CPAP USES O2    Umbilical hernia 8/23/5312      Past surgical history:   Past Surgical History:   Procedure Laterality Date    CARDIAC SURG PROCEDURE UNLIST      CARDIAC CATH    HX CATARACT REMOVAL Bilateral 2007    HX GI  2009    EGD    HX HERNIA REPAIR  08/03/2017    [facility]air [facility] umbilical hernia with underlay [facility]Dr. Kyle Price    HX KNEE ARTHROSCOPY Left 46'S    X3    HX KNEE ARTHROSCOPY Right 1970'S    X2      Family history:   Family History   Problem Relation Age of Onset    Stroke Mother     Kidney Disease Father     Cancer Sister         KIDNEY    No Known Problems Brother     Stroke Sister     Other Sister         VASCULAR DISEASE    Anesth Problems Neg Hx       Social history:   Social History     Socioeconomic History    Marital status:      Spouse name: Not on file    Number of children: Not on file    Years of education: Not on file    Highest education level: Not on file   Occupational History    Not on file   Social Needs    Financial resource strain: Not on file    Food insecurity     Worry: Not on file     Inability: Not on file    Transportation needs     Medical: Not on file     Non-medical: Not on file   Tobacco Use    Smoking status: Former Smoker     Packs/day: 1.00 Years: 10.00     Pack years: 10.00     Last attempt to quit: 1965     Years since quittin.3    Smokeless tobacco: Never Used   Substance and Sexual Activity    Alcohol use: Yes    Drug use: No    Sexual activity: Not on file   Lifestyle    Physical activity     Days per week: Not on file     Minutes per session: Not on file    Stress: Not on file   Relationships    Social connections     Talks on phone: Not on file     Gets together: Not on file     Attends [employer] service: Not on file     Active member of club or organization: Not on file     Attends meetings of clubs or organizations: Not on file     Relationship status: Not on file    Intimate partner violence     Fear of current or ex partner: Not on file     Emotionally abused: Not on file     Physically abused: Not on file     Forced sexual activity: Not on file   Other Topics Concern    Not on file   Social History Narrative    ** Merged History Encounter **           Home Medications:   Prior to Admission medications    Medication Sig Start Date End Date Taking? Authorizing Provider   dilTIAZem CD (CARTIA XT) 180 mg ER capsule Take 180 mg by mouth two (2) times a day. Yes Provider, Historical   fish oil-omega-3 fatty acids (FISH OIL) 340-1,000 mg capsule Take 1 Cap by mouth daily. Yes Provider, Historical   therapeutic multivitamin-minerals (VITAMINS AND MINERALS) tablet Take 1 Tab by mouth. Yes Provider, Historical   labetalol (NORMODYNE) 200 mg tablet TAKE ONE TABLET BY MOUTH ONCE DAILY  Patient taking differently: 100 mg two (2) times a day. PATIENT STATES SHE TAKES MED AT 4PM AND BEDTIME. PATIENT STATES IF BP GREATER THAN 150 SHE TAKES 4PM DOSE IF LESS THAN 150 SHE ONLY TAKES BEDTIME DOSE. 17  Yes Maira Vogt MD      Allergies: Allergies   Allergen Reactions    Benicar [Olmesartan] Cough    Cleocin [Clindamycin Hcl] Hives    Hydrochlorothiazide Other (comments)     GI upset - pancreatitis ?     Keflex [Cephalexin] Hives    Sulfa (Sulfonamide Antibiotics) Unknown (comments)      Review of systems:  Denies headache, fever, chills, weight change, congestion, sore throat, chest pain, shortness of breath, nausea, vomiting, diarrhea, constipation, abdominal pain, generalized weakness, muscle or joint pain, and rash. Physical Exam:  Vitals: Blood pressure (!) 190/64, pulse 64, temperature 98 °F (36.7 °C), resp. rate 18, height 5' 3\" (1.6 m), weight 58.5 kg (129 lb), SpO2 97 %.    General: awake and alert, NAD  Neck: supple  Breasts:  no known breast pathology  Cor: RRR  Lungs: clear  Abdomen: soft, non-tender, non-distended  Extremities: no edema  Skin: squamous cell carcinoma    Impression: SCCA LOWER LEFT LIMB right lower leg and right upper thigh    Plan:  Procedure(s):  EXCISION SQUAMOUS CELL CARCINOMA RIGHT ANTERIOR LOWER LEG WITH FASCIOCUTANEOUS FLAP OR SPLIT THICKNESS SKIN GRAFT,  EXCISION SQUAMOUS CELL CARCINOMA RIGHT UPPER THIGH WITH FLAP CLOSURE AND FROZEN SECTIONS

## 2020-11-16 NOTE — ANESTHESIA PREPROCEDURE EVALUATION
Anesthetic History   No history of anesthetic complications            Review of Systems / Medical History  Patient summary reviewed, nursing notes reviewed and pertinent labs reviewed    Pulmonary  Within defined limits      Sleep apnea           Neuro/Psych   Within defined limits           Cardiovascular  Within defined limits  Hypertension        Dysrhythmias : atrial fibrillation           GI/Hepatic/Renal  Within defined limits              Endo/Other  Within defined limits      Arthritis     Other Findings              Physical Exam    Airway  Mallampati: II  TM Distance: 4 - 6 cm  Neck ROM: normal range of motion   Mouth opening: Normal     Cardiovascular  Regular rate and rhythm,  S1 and S2 normal,  no murmur, click, rub, or gallop             Dental  No notable dental hx       Pulmonary  Breath sounds clear to auscultation               Abdominal  GI exam deferred       Other Findings            Anesthetic Plan    ASA: 2  Anesthesia type: MAC          Induction: Intravenous  Anesthetic plan and risks discussed with: Patient

## 2020-11-17 NOTE — OP NOTES
[address] [facility]  OPERATIVE REPORT    Name:  Belen Varma  MR#:  [id number]  :  [id number]  ACCOUNT #:  [de-identified]  DATE OF SERVICE:  2020    PREOPERATIVE DIAGNOSES:  1.  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 2.  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 POSTOPERATIVE DIAGNOSES:  1.  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 2.  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 PROCEDURE PERFORMED:  1. Excision of squamous cell carcinoma anterior right lower leg with 1 cm margins and fasciocutaneous flap closure of lower leg defect based on perforators from the right peroneal artery and vein. 2.  Excision of squamous cell carcinoma of the right lateral upper thigh with 1 cm margins and flap closure of 25 cm2 thigh defect. SURGEON:  Antionette Estrada III, MD    ASSISTANT:  none    ANESTHESIA:  MAC.    COMPLICATIONS:  none. SPECIMENS REMOVED:  See op note. IMPLANTS:  none. ESTIMATED BLOOD LOSS:  Negligible. INDICATIONS:  This patient was brought to the operating room today for surgical treatment of these large growing squamous cell carcinomas, which were biopsy proven. Preoperatively, she was marked for surgery and the details of the planned procedure were discussed with her including the scars which would result and the risk involved as well as the healing process. She appeared to understand all of these considerations. PROCEDURE AND FINDINGS:  The patient was brought to the operating room and sedation anesthesia was induced. Local anesthesia was induced around both skin cancers and in the planned flaps with 1% lidocaine and 1:100,000 epinephrine. The right lower extremity was then prepped and draped circumferentially into a sterile field. I began the procedure with circular excisions of both skin cancers.   The deep plane of dissection in both skin cancers was down to fascia. No muscle was taken. They were both sent for frozen section analysis. The frozen sections returned squamous cell carcinoma of the right lower anterior leg with clear margins. The frozen section on the thigh lesion showed clear peripheral margins and deep margins; however, there was trouble on the thigh lesion with freezing the fat that was part of the deep margin. I felt that grossly I was well around this thigh cancer, so I did not further remove anymore tissue awaiting permanent section for final pathology. I then turned my attention to closure of both wounds. The thigh wound was closed with an advancement flap from the posterior thigh. This was designed, cut, raised and brought into position advancing the posterior lateral thigh skin into the defect. After careful skin trimming and tissue rearrangement, hemostasis was secured and the wound was closed with interrupted 3-0 Vicryl in the deep dermis with buried knots and running 3-0 Prolene in the skin. The flap appeared viable and nicely closed the incision. For the anterior lower leg wound, the defect was larger and more difficult because there was not excess tissue there. Therefore, a flap was created on the lateral skin and fascia of the leg. This was designed, cut and raised. A  from the peroneal artery and vein was identified entering the base of the flap and was preserved creating a proximally based fasciocutaneous flap. This was brought into position. After careful skin trimming and tissue rearrangement, hemostasis was secured and the wounds were closed with interrupted 3-0 Vicryl in the deep dermis with buried knots and running 3-0 Prolene in the skin. Sterile dressings were applied to both wounds. The patient awoke up from the anesthetic and went to the recovery room in good condition. Final sponge and needle counts were reported to be correct.   Blood loss for this operation was negligible.       MD SYLVIA Sherman [id number]  D:  11/17/2020 6:31  T:  11/17/2020 7:41  JOB #:  [id number]

## 2022-03-20 PROBLEM — K42.9 UMBILICAL HERNIA: Status: ACTIVE | Noted: 2017-02-22

## (undated) DEVICE — HANDLE LT SNAP ON ULT DURABLE LENS FOR TRUMPF ALC DISPOSABLE

## (undated) DEVICE — Device

## (undated) DEVICE — SUTURE VCRL SZ 3-0 L27IN ABSRB UD L26MM SH 1/2 CIR J416H

## (undated) DEVICE — SOLUTION IV 1000ML 0.9% SOD CHL

## (undated) DEVICE — NEEDLE HYPO 25GA L1.5IN BVL ORIENTED ECLIPSE

## (undated) DEVICE — SURGICAL PROCEDURE PACK BASIN MAJ SET CUST NO CAUT

## (undated) DEVICE — REM POLYHESIVE ADULT PATIENT RETURN ELECTRODE: Brand: VALLEYLAB

## (undated) DEVICE — GARMENT,MEDLINE,DVT,INT,CALF,MED, GEN2: Brand: MEDLINE

## (undated) DEVICE — SUT PROL 3-0 36IN SH DA BLU --

## (undated) DEVICE — INFECTION CONTROL KIT SYS

## (undated) DEVICE — KENDALL SCD EXPRESS SLEEVES, KNEE LENGTH, MEDIUM: Brand: KENDALL SCD

## (undated) DEVICE — PREP SKN CHLRAPRP APL 26ML STR --

## (undated) DEVICE — STERILE POLYISOPRENE POWDER-FREE SURGICAL GLOVES WITH EMOLLIENT COATING: Brand: PROTEXIS

## (undated) DEVICE — SUTURE ETHLN SZ 4-0 L18IN NONABSORBABLE BLK L16MM PC-3 3/8 1864G

## (undated) DEVICE — DERMABOND SKIN ADH 0.7ML -- DERMABOND ADVANCED 12/BX

## (undated) DEVICE — (D)PREP SKN CHLRAPRP APPL 26ML -- CONVERT TO ITEM 371833

## (undated) DEVICE — SPONGE GZ W4XL4IN COT 12 PLY TYP VII WVN C FLD DSGN

## (undated) DEVICE — TOWEL SURG W17XL27IN STD BLU COT NONFENESTRATED PREWASHED

## (undated) DEVICE — SPONGE LAP 18X18IN STRL -- 5/PK

## (undated) DEVICE — SUTURE MCRYL SZ 4-0 L27IN ABSRB UD L19MM PS-2 1/2 CIR PRIM Y426H

## (undated) DEVICE — DEVON™ KNEE AND BODY STRAP 60" X 3" (1.5 M X 7.6 CM): Brand: DEVON

## (undated) DEVICE — TOWEL,OR,DSP,ST,BLUE,STD,4/PK,20PK/CS: Brand: MEDLINE

## (undated) DEVICE — DRAPE,LAPAROTOMY,T,PEDI,STERILE: Brand: MEDLINE

## (undated) DEVICE — ROCKER SWITCH PENCIL BLADE ELECTRODE, HOLSTER: Brand: EDGE

## (undated) DEVICE — (D)SYR 10ML 1/5ML GRAD NSAF -- PKGING CHANGE USE ITEM 338027

## (undated) DEVICE — DBD-PACK,LAPAROTOMY,2 REINFORCED GOWNS: Brand: MEDLINE

## (undated) DEVICE — STERILE POLYISOPRENE POWDER-FREE SURGICAL GLOVES: Brand: PROTEXIS

## (undated) DEVICE — TRAY PREP DRY W/ PREM GLV 2 APPL 6 SPNG 2 UNDPD 1 OVERWRAP